# Patient Record
Sex: FEMALE | Race: WHITE | NOT HISPANIC OR LATINO
[De-identification: names, ages, dates, MRNs, and addresses within clinical notes are randomized per-mention and may not be internally consistent; named-entity substitution may affect disease eponyms.]

---

## 2017-02-12 PROBLEM — Z00.00 ENCOUNTER FOR PREVENTIVE HEALTH EXAMINATION: Status: ACTIVE | Noted: 2017-02-12

## 2017-03-13 ENCOUNTER — APPOINTMENT (OUTPATIENT)
Dept: OTOLARYNGOLOGY | Facility: CLINIC | Age: 82
End: 2017-03-13

## 2017-10-26 ENCOUNTER — OUTPATIENT (OUTPATIENT)
Dept: OUTPATIENT SERVICES | Facility: HOSPITAL | Age: 82
LOS: 1 days | Discharge: HOME | End: 2017-10-26

## 2017-10-30 DIAGNOSIS — Z13.820 ENCOUNTER FOR SCREENING FOR OSTEOPOROSIS: ICD-10-CM

## 2017-10-30 DIAGNOSIS — M89.9 DISORDER OF BONE, UNSPECIFIED: ICD-10-CM

## 2017-10-30 DIAGNOSIS — Z78.0 ASYMPTOMATIC MENOPAUSAL STATE: ICD-10-CM

## 2017-11-27 ENCOUNTER — OUTPATIENT (OUTPATIENT)
Dept: OUTPATIENT SERVICES | Facility: HOSPITAL | Age: 82
LOS: 1 days | Discharge: HOME | End: 2017-11-27

## 2017-11-27 DIAGNOSIS — M25.561 PAIN IN RIGHT KNEE: ICD-10-CM

## 2018-01-04 ENCOUNTER — APPOINTMENT (OUTPATIENT)
Dept: OTOLARYNGOLOGY | Facility: CLINIC | Age: 83
End: 2018-01-04

## 2018-04-12 ENCOUNTER — OUTPATIENT (OUTPATIENT)
Dept: OUTPATIENT SERVICES | Facility: HOSPITAL | Age: 83
LOS: 1 days | Discharge: HOME | End: 2018-04-12

## 2018-04-12 DIAGNOSIS — M48.02 SPINAL STENOSIS, CERVICAL REGION: ICD-10-CM

## 2018-07-06 ENCOUNTER — OUTPATIENT (OUTPATIENT)
Dept: OUTPATIENT SERVICES | Facility: HOSPITAL | Age: 83
LOS: 1 days | Discharge: HOME | End: 2018-07-06

## 2018-07-06 DIAGNOSIS — M54.2 CERVICALGIA: ICD-10-CM

## 2018-07-25 ENCOUNTER — APPOINTMENT (OUTPATIENT)
Dept: OTOLARYNGOLOGY | Facility: CLINIC | Age: 83
End: 2018-07-25

## 2018-09-10 ENCOUNTER — APPOINTMENT (OUTPATIENT)
Dept: OTOLARYNGOLOGY | Facility: CLINIC | Age: 83
End: 2018-09-10

## 2020-10-07 ENCOUNTER — APPOINTMENT (OUTPATIENT)
Dept: VASCULAR SURGERY | Facility: CLINIC | Age: 85
End: 2020-10-07
Payer: MEDICARE

## 2020-10-07 VITALS — DIASTOLIC BLOOD PRESSURE: 75 MMHG | SYSTOLIC BLOOD PRESSURE: 132 MMHG

## 2020-10-07 VITALS — HEIGHT: 64 IN | BODY MASS INDEX: 18.95 KG/M2 | WEIGHT: 111 LBS | TEMPERATURE: 97.4 F

## 2020-10-07 PROCEDURE — 99203 OFFICE O/P NEW LOW 30 MIN: CPT

## 2020-10-07 PROCEDURE — 93880 EXTRACRANIAL BILAT STUDY: CPT

## 2021-03-11 ENCOUNTER — EMERGENCY (EMERGENCY)
Facility: HOSPITAL | Age: 86
LOS: 0 days | Discharge: HOME | End: 2021-03-11
Attending: EMERGENCY MEDICINE | Admitting: EMERGENCY MEDICINE
Payer: MEDICARE

## 2021-03-11 VITALS
OXYGEN SATURATION: 97 % | DIASTOLIC BLOOD PRESSURE: 67 MMHG | RESPIRATION RATE: 19 BRPM | WEIGHT: 110.01 LBS | TEMPERATURE: 98 F | SYSTOLIC BLOOD PRESSURE: 133 MMHG | HEART RATE: 85 BPM
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
## 2021-03-11 DIAGNOSIS — M79.641 PAIN IN RIGHT HAND: ICD-10-CM
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
## 2021-03-11 DIAGNOSIS — Z88.5 ALLERGY STATUS TO NARCOTIC AGENT: ICD-10-CM

## 2021-03-11 DIAGNOSIS — M25.531 PAIN IN RIGHT WRIST: ICD-10-CM

## 2021-03-11 DIAGNOSIS — I10 ESSENTIAL (PRIMARY) HYPERTENSION: ICD-10-CM
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
## 2021-03-11 PROCEDURE — 73110 X-RAY EXAM OF WRIST: CPT | Mod: 26,RT

## 2021-03-11 PROCEDURE — 99283 EMERGENCY DEPT VISIT LOW MDM: CPT

## 2021-03-11 PROCEDURE — 73130 X-RAY EXAM OF HAND: CPT | Mod: 26,RT

## 2021-03-11 RX ORDER — ACETAMINOPHEN 500 MG
650 TABLET ORAL ONCE
Refills: 0 | Status: COMPLETED | OUTPATIENT
Start: 2021-03-11 | End: 2021-03-11

## 2021-03-11 RX ADMIN — Medication 650 MILLIGRAM(S): at 11:42

## 2021-03-11 NOTE — ED PROVIDER NOTE - NS ED ROS FT
Review of Systems:  CONSTITUTIONAL: No fever/chills  SKIN: No rash  EYES: No eye pain, No blurred vision  ENT: No sore throat, No neck pain, No rhinorrhea, No ear pain  RESPIRATORY: No shortness of breath, No cough  CARDIAC: No chest pain, No palpitations  GI: No abdominal pain, No nausea, No vomiting, No diarrhea, No constipation  MUSCULOSKELETAL: +R hand/wrist pain see HPI. No swelling, No back pain  NEUROLOGIC: No numbness, No focal weakness, No headache, No dizziness  All other systems negative, unless specified in HPI

## 2021-03-11 NOTE — ED PROVIDER NOTE - PATIENT PORTAL LINK FT
You can access the FollowMyHealth Patient Portal offered by [facility] by registering at the following website: [email or web address]. By joining [employer]’s FollowMyHealth portal, you will also be able to view your health information using other applications (apps) compatible with our system.

## 2021-03-11 NOTE — ED PROVIDER NOTE - OBJECTIVE STATEMENT
86F PMHx osteoarthritis and HTN presents to ED for R hand and wrist pain x 1 week. Pt reports she threw her purse in her car 1 week ago and felt like she strained her R hand, has been having intermittent mild to moderate R hand and wrist pain since event. Took Tylenol once yesterday with some relief. Denies numbness, tingling, weakness, dizziness, chest pain, sob, abd pain, nausea, vomiting, fever/chills, all other complaints.

## 2021-03-11 NOTE — ED PROVIDER NOTE - PHYSICAL EXAMINATION
VITAL SIGNS: I have reviewed nursing notes and confirm.  CONSTITUTIONAL: well-appearing thin elderly woman sitting up in chair in NAD  SKIN: Warm dry, normal skin turgor  HEAD: NCAT  EYES: EOMI, no scleral icterus  NECK: Supple; non tender.   CARD: RRR, no murmurs, rubs or gallops  RESP: clear to ausculation b/l.  No rales, rhonchi, or wheezing.  ABD: soft, non-tender, non-distended, no rebound or guarding.   EXT: ttp to dorsum of R lateral wrist, +snuffbox tenderness, limited flexion and extension of wrist due to pain, peripheral pulses 2+ bilateral, normal sensation  NEURO: normal motor. normal sensory. Normal gait.  PSYCH: Cooperative, appropriate.

## 2021-03-11 NOTE — ED PROVIDER NOTE - CARE PROVIDER_API CALL
Jaya Holliday)  Orthopaedic Surgery  [address] [place] [address]  Phone: [phone]  Fax: [phone]  Follow Up Time: 1-3 Days

## 2021-03-11 NOTE — ED PROVIDER NOTE - CLINICAL SUMMARY MEDICAL DECISION MAKING FREE TEXT BOX
patient presents with right sided wrist pain after sustaining an extension injury while putting her purse in a car approx 1 week prior she has slight ttp of the right distal radius cap refill normal, radial pulses 2 +=,  no signs of infection at this time although xray appears negative patient has extensive arthritis based on clinical exam placed patient in a splint with follow up to ortho dr castano for potential advanced imaging at this time we discussed indications to return

## 2021-03-11 NOTE — ED PROVIDER NOTE - ATTENDING CONTRIBUTION TO CARE
I was present for and supervised the key and critical aspects of the procedures performed during the care of the patient. patient presents with right sided wrist pain after sustaining an extension injury while putting her purse in a car approx 1 week prior she has slight ttp of the right distal radius cap refill normal, radial pulses 2 +=,  no signs of infection at this time although xray appears negative patient has extensive arthritis based on clinical exam placed patient in a splint with follow up to ortho dr castano for potential advanced imaging at this time we discussed indications to return

## 2021-03-11 NOTE — ED PROVIDER NOTE - NSFOLLOWUPINSTRUCTIONS_ED_ALL_ED_FT
Please follow up with Dr. Holliday in 24-48 hours. Please keep your splint dry and clean until you follow up. You may take Tylenol as needed for pain relief.    Hand Pain  Many things can cause hand pain. Some common causes are:  An injury.  Repeating the same movement with your hand over and over (overuse).  Osteoporosis.  Arthritis.  Lumps in the tendons or joints of the hand and wrist (ganglion cysts).  Infection.  Follow these instructions at home:  Pay attention to any changes in your symptoms. Take these actions to help with your discomfort:  If directed, put ice on the affected area:  Put ice in a plastic bag.  Place a towel between your skin and the bag.  Leave the ice on for 15–20 minutes, 3?4 times a day for 2 days.  Take over-the-counter and prescription medicines only as told by your health care provider.  Minimize stress on your hands and wrists as much as possible.  Take breaks from repetitive activity often.  Do stretches as told by your health care provider.  Do not do activities that make your pain worse.  Contact a health care provider if:  Your pain does not get better after a few days of self-care.  Your pain gets worse.  Your pain affects your ability to do your daily activities.  Get help right away if:  Your hand becomes warm, red, or swollen.  Your hand is numb or tingling.  Your hand is extremely swollen or deformed.  Your hand or fingers turn white or blue.  You cannot move your hand, wrist, or fingers.  This information is not intended to replace advice given to you by your health care provider. Make sure you discuss any questions you have with your health care provider.    ----------------------------    Cast or Splint Care  Casts and splints are supports that are worn to protect broken bones and other injuries. A cast or splint may hold a bone still and in the correct position while it heals. Casts and splints may also help ease pain, swelling, and muscle spasms.    A cast is a hardened support that is usually made of fiberglass or plaster. It is custom-fit to the body and it offers more protection than a splint. It cannot be taken off and put back on. A splint is a type of soft support that is usually made from cloth and elastic. It can be adjusted or taken off as needed.    Your child may need a cast or a splint if he or she:  Has a broken bone.  Has a soft-tissue injury.  Needs to keep an injured body part from moving (keep it immobile) after surgery.  How to care for your child's cast  Do not allow your child to stick anything inside the cast to scratch the skin. Sticking something in the cast increases your child's risk of infection.  Check the skin around the cast every day. Tell your child's health care provider about any concerns.  You may put lotion on dry skin around the edges of the cast. Do not put lotion on the skin underneath the cast.  Keep the cast clean.  If the cast is not waterproof:  Do not let it get wet.  Cover it with a watertight covering when your child takes a bath or a shower.  How to care for your child's splint  Have your child wear it as told by your child's health care provider. Remove it only as told by your child's health care provider.  Loosen the splint if your child's fingers or toes tingle, become numb, or turn cold and blue.  Keep the splint clean.  If the splint is not waterproof:  Do not let it get wet.  Cover it with a watertight covering when your child takes a bath or a shower.  Follow these instructions at home:  Bathing     Do not have your child take baths or swim until his or her health care provider approves. Ask your child's health care provider if your child can take showers. Your child may only be allowed to take sponge baths for bathing.  If your child's cast or splint is not waterproof, cover it with a watertight covering when he or she takes a bath or shower.  Managing pain, stiffness, and swelling       Have your child move his or her fingers or toes often to avoid stiffness and to lessen swelling.  Have your child raise (elevate) the injured area above the level of his or her heart while he or she is sitting or lying down.  Safety     Do not allow your child to use the injured limb to support his or her body weight until your child's health care provider says that it is okay.  Have your child use crutches or other assistive devices as told by your child's health care provider.  General instructions     Do not allow your child to put pressure on any part of the cast or splint until it is fully hardened. This may take several hours.  Have your child return to his or her normal activities as told by his or her health care provider. Ask your child's health care provider what activities are safe for your child.  Give over-the-counter and prescription medicines only as told by your child's health care provider.  Keep all follow-up visits as told by your child’s health care provider. This is important.  Contact a health care provider if:  Your child’s cast or splint gets damaged.  Your child's skin under or around the cast becomes red or raw.  Your child’s skin under the cast is extremely itchy or painful.  Your child's cast or splint feels very uncomfortable.  Your child’s cast or splint is too tight or too loose.  Your child’s cast becomes wet or it develops a soft spot or area.  Your child gets an object stuck under the cast.  Get help right away if:  Your child's pain is getting worse.  Your child’s injured area tingles, becomes numb, or turns cold and blue.  The part of your child's body above or below the cast is swollen or discolored.  Your child cannot feel or move his or her fingers or toes.  There is fluid leaking through the cast.  Your child has severe pain or pressure under the cast.  This information is not intended to replace advice given to you by your health care provider. Make sure you discuss any questions you have with your health care provider

## 2021-03-12 NOTE — ED POST DISCHARGE NOTE - RESULT SUMMARY
R HAND- AVASCULAR NECROSIS SCHAPHOID/LUNUTE IS A POSSIBILITY. PATIENT SAW ORTHO TODAY AND F/U IN ABOUT 3 WEEKS. AND WILL DISCUSS THIS ISSUE THEN.

## 2021-09-07 ENCOUNTER — APPOINTMENT (OUTPATIENT)
Dept: PULMONOLOGY | Facility: CLINIC | Age: 86
End: 2021-09-07
Payer: MEDICARE

## 2021-09-07 VITALS
RESPIRATION RATE: 14 BRPM | DIASTOLIC BLOOD PRESSURE: 80 MMHG | SYSTOLIC BLOOD PRESSURE: 130 MMHG | HEART RATE: 88 BPM | HEIGHT: 64 IN | BODY MASS INDEX: 18.95 KG/M2 | OXYGEN SATURATION: 96 % | WEIGHT: 111 LBS

## 2021-09-07 PROCEDURE — 71046 X-RAY EXAM CHEST 2 VIEWS: CPT

## 2021-09-07 PROCEDURE — 99213 OFFICE O/P EST LOW 20 MIN: CPT | Mod: 25

## 2021-10-06 ENCOUNTER — APPOINTMENT (OUTPATIENT)
Dept: VASCULAR SURGERY | Facility: CLINIC | Age: 86
End: 2021-10-06

## 2021-11-05 ENCOUNTER — OUTPATIENT (OUTPATIENT)
Dept: OUTPATIENT SERVICES | Facility: HOSPITAL | Age: 86
LOS: 1 days | Discharge: HOME | End: 2021-11-05

## 2021-11-05 DIAGNOSIS — Z11.59 ENCOUNTER FOR SCREENING FOR OTHER VIRAL DISEASES: ICD-10-CM

## 2021-11-08 ENCOUNTER — OUTPATIENT (OUTPATIENT)
Dept: OUTPATIENT SERVICES | Facility: HOSPITAL | Age: 86
LOS: 1 days | Discharge: HOME | End: 2021-11-08
Payer: MEDICARE

## 2021-11-08 ENCOUNTER — RESULT REVIEW (OUTPATIENT)
Age: 86
End: 2021-11-08

## 2021-11-08 ENCOUNTER — TRANSCRIPTION ENCOUNTER (OUTPATIENT)
Age: 86
End: 2021-11-08

## 2021-11-08 VITALS
HEART RATE: 72 BPM | SYSTOLIC BLOOD PRESSURE: 115 MMHG | RESPIRATION RATE: 16 BRPM | DIASTOLIC BLOOD PRESSURE: 58 MMHG | OXYGEN SATURATION: 96 % | TEMPERATURE: 98 F

## 2021-11-08 VITALS — HEIGHT: 66 IN | WEIGHT: 110.01 LBS

## 2021-11-08 DIAGNOSIS — Z98.1 ARTHRODESIS STATUS: Chronic | ICD-10-CM

## 2021-11-08 PROCEDURE — 88312 SPECIAL STAINS GROUP 1: CPT | Mod: 26

## 2021-11-08 PROCEDURE — 88305 TISSUE EXAM BY PATHOLOGIST: CPT | Mod: 26

## 2021-11-08 NOTE — CHART NOTE - NSCHARTNOTEFT_GEN_A_CORE
PACU ANESTHESIA ADMISSION NOTE      Procedure:   Post op diagnosis:      ____  Intubated  TV:______       Rate: ______      FiO2: ______    _x___  Patent Airway    __x__  Full return of protective reflexes    _x___  Full recovery from anesthesia / back to baseline     Vitals:   T: 98          R:  12                BP: 121/78                 Sat: 100                  P: 80      Mental Status:  ___x_ Awake   __x___ Alert   _____ Drowsy   _____ Sedated    Nausea/Vomiting:  __x__ NO  ______Yes,   See Post - Op Orders          Pain Scale (0-10):  _____    Treatment: __x__ None    ____ See Post - Op/PCA Orders    Post - Operative Fluids:   ____ Oral   _x___ See Post - Op Orders    Plan: Discharge:   _x___Home       _____Floor     _____Critical Care    _____  Other:_________________    Comments:

## 2021-11-11 LAB — SURGICAL PATHOLOGY STUDY: SIGNIFICANT CHANGE UP

## 2021-11-17 DIAGNOSIS — J44.9 CHRONIC OBSTRUCTIVE PULMONARY DISEASE, UNSPECIFIED: ICD-10-CM

## 2021-11-17 DIAGNOSIS — I10 ESSENTIAL (PRIMARY) HYPERTENSION: ICD-10-CM

## 2021-11-17 DIAGNOSIS — E78.00 PURE HYPERCHOLESTEROLEMIA, UNSPECIFIED: ICD-10-CM

## 2021-11-17 DIAGNOSIS — Z88.5 ALLERGY STATUS TO NARCOTIC AGENT: ICD-10-CM

## 2021-11-17 DIAGNOSIS — K29.50 UNSPECIFIED CHRONIC GASTRITIS WITHOUT BLEEDING: ICD-10-CM

## 2021-11-17 DIAGNOSIS — Z87.891 PERSONAL HISTORY OF NICOTINE DEPENDENCE: ICD-10-CM

## 2021-11-17 DIAGNOSIS — K20.90 ESOPHAGITIS, UNSPECIFIED WITHOUT BLEEDING: ICD-10-CM

## 2021-11-17 DIAGNOSIS — D64.9 ANEMIA, UNSPECIFIED: ICD-10-CM

## 2021-11-17 DIAGNOSIS — K57.30 DIVERTICULOSIS OF LARGE INTESTINE WITHOUT PERFORATION OR ABSCESS WITHOUT BLEEDING: ICD-10-CM

## 2021-11-17 DIAGNOSIS — K64.8 OTHER HEMORRHOIDS: ICD-10-CM

## 2021-11-17 DIAGNOSIS — K64.4 RESIDUAL HEMORRHOIDAL SKIN TAGS: ICD-10-CM

## 2021-11-29 ENCOUNTER — EMERGENCY (EMERGENCY)
Facility: HOSPITAL | Age: 86
LOS: 0 days | Discharge: HOME | End: 2021-11-29
Admitting: EMERGENCY MEDICINE
Payer: MEDICARE

## 2021-11-29 ENCOUNTER — INPATIENT (INPATIENT)
Facility: HOSPITAL | Age: 86
LOS: 3 days | Discharge: HOME HEALTH | End: 2021-12-03
Attending: FAMILY MEDICINE | Admitting: FAMILY MEDICINE
Payer: MEDICARE

## 2021-11-29 VITALS
DIASTOLIC BLOOD PRESSURE: 90 MMHG | OXYGEN SATURATION: 85 % | HEIGHT: 64 IN | SYSTOLIC BLOOD PRESSURE: 185 MMHG | RESPIRATION RATE: 28 BRPM | HEART RATE: 100 BPM | WEIGHT: 115.08 LBS | TEMPERATURE: 100 F

## 2021-11-29 DIAGNOSIS — Z20.822 CONTACT WITH AND (SUSPECTED) EXPOSURE TO COVID-19: ICD-10-CM

## 2021-11-29 DIAGNOSIS — Z87.891 PERSONAL HISTORY OF NICOTINE DEPENDENCE: ICD-10-CM

## 2021-11-29 DIAGNOSIS — J44.1 CHRONIC OBSTRUCTIVE PULMONARY DISEASE WITH (ACUTE) EXACERBATION: ICD-10-CM

## 2021-11-29 DIAGNOSIS — R06.02 SHORTNESS OF BREATH: ICD-10-CM

## 2021-11-29 DIAGNOSIS — Z02.9 ENCOUNTER FOR ADMINISTRATIVE EXAMINATIONS, UNSPECIFIED: ICD-10-CM

## 2021-11-29 DIAGNOSIS — Z98.1 ARTHRODESIS STATUS: Chronic | ICD-10-CM

## 2021-11-29 DIAGNOSIS — Z88.5 ALLERGY STATUS TO NARCOTIC AGENT: ICD-10-CM

## 2021-11-29 LAB
ALBUMIN SERPL ELPH-MCNC: 4.4 G/DL — SIGNIFICANT CHANGE UP (ref 3.5–5.2)
ALP SERPL-CCNC: 97 U/L — SIGNIFICANT CHANGE UP (ref 30–115)
ALT FLD-CCNC: 11 U/L — SIGNIFICANT CHANGE UP (ref 0–41)
ANION GAP SERPL CALC-SCNC: 14 MMOL/L — SIGNIFICANT CHANGE UP (ref 7–14)
APTT BLD: 31.4 SEC — SIGNIFICANT CHANGE UP (ref 27–39.2)
AST SERPL-CCNC: 20 U/L — SIGNIFICANT CHANGE UP (ref 0–41)
BASOPHILS # BLD AUTO: 0.04 K/UL — SIGNIFICANT CHANGE UP (ref 0–0.2)
BASOPHILS NFR BLD AUTO: 0.3 % — SIGNIFICANT CHANGE UP (ref 0–1)
BILIRUB SERPL-MCNC: 0.3 MG/DL — SIGNIFICANT CHANGE UP (ref 0.2–1.2)
BUN SERPL-MCNC: 10 MG/DL — SIGNIFICANT CHANGE UP (ref 10–20)
CALCIUM SERPL-MCNC: 9.2 MG/DL — SIGNIFICANT CHANGE UP (ref 8.5–10.1)
CHLORIDE SERPL-SCNC: 96 MMOL/L — LOW (ref 98–110)
CO2 SERPL-SCNC: 22 MMOL/L — SIGNIFICANT CHANGE UP (ref 17–32)
CREAT SERPL-MCNC: 0.7 MG/DL — SIGNIFICANT CHANGE UP (ref 0.7–1.5)
EOSINOPHIL # BLD AUTO: 0 K/UL — SIGNIFICANT CHANGE UP (ref 0–0.7)
EOSINOPHIL NFR BLD AUTO: 0 % — SIGNIFICANT CHANGE UP (ref 0–8)
GLUCOSE SERPL-MCNC: 122 MG/DL — HIGH (ref 70–99)
HCT VFR BLD CALC: 35.1 % — LOW (ref 37–47)
HGB BLD-MCNC: 11.1 G/DL — LOW (ref 12–16)
IMM GRANULOCYTES NFR BLD AUTO: 0.3 % — SIGNIFICANT CHANGE UP (ref 0.1–0.3)
INR BLD: 0.96 RATIO — SIGNIFICANT CHANGE UP (ref 0.65–1.3)
LYMPHOCYTES # BLD AUTO: 0.75 K/UL — LOW (ref 1.2–3.4)
LYMPHOCYTES # BLD AUTO: 5.8 % — LOW (ref 20.5–51.1)
MCHC RBC-ENTMCNC: 26.1 PG — LOW (ref 27–31)
MCHC RBC-ENTMCNC: 31.6 G/DL — LOW (ref 32–37)
MCV RBC AUTO: 82.6 FL — SIGNIFICANT CHANGE UP (ref 81–99)
MONOCYTES # BLD AUTO: 1.17 K/UL — HIGH (ref 0.1–0.6)
MONOCYTES NFR BLD AUTO: 9 % — SIGNIFICANT CHANGE UP (ref 1.7–9.3)
NEUTROPHILS # BLD AUTO: 11.01 K/UL — HIGH (ref 1.4–6.5)
NEUTROPHILS NFR BLD AUTO: 84.6 % — HIGH (ref 42.2–75.2)
NRBC # BLD: 0 /100 WBCS — SIGNIFICANT CHANGE UP (ref 0–0)
PLATELET # BLD AUTO: 322 K/UL — SIGNIFICANT CHANGE UP (ref 130–400)
POTASSIUM SERPL-MCNC: 4.6 MMOL/L — SIGNIFICANT CHANGE UP (ref 3.5–5)
POTASSIUM SERPL-SCNC: 4.6 MMOL/L — SIGNIFICANT CHANGE UP (ref 3.5–5)
PROT SERPL-MCNC: 6.6 G/DL — SIGNIFICANT CHANGE UP (ref 6–8)
PROTHROM AB SERPL-ACNC: 11.1 SEC — SIGNIFICANT CHANGE UP (ref 9.95–12.87)
RAPID RVP RESULT: DETECTED
RBC # BLD: 4.25 M/UL — SIGNIFICANT CHANGE UP (ref 4.2–5.4)
RBC # FLD: 17.9 % — HIGH (ref 11.5–14.5)
RSV RNA SPEC QL NAA+PROBE: DETECTED
SARS-COV-2 RNA SPEC QL NAA+PROBE: SIGNIFICANT CHANGE UP
SODIUM SERPL-SCNC: 132 MMOL/L — LOW (ref 135–146)
TROPONIN T SERPL-MCNC: <0.01 NG/ML — SIGNIFICANT CHANGE UP
WBC # BLD: 13.01 K/UL — HIGH (ref 4.8–10.8)
WBC # FLD AUTO: 13.01 K/UL — HIGH (ref 4.8–10.8)

## 2021-11-29 PROCEDURE — 99221 1ST HOSP IP/OBS SF/LOW 40: CPT

## 2021-11-29 PROCEDURE — 99285 EMERGENCY DEPT VISIT HI MDM: CPT

## 2021-11-29 PROCEDURE — L9981: CPT

## 2021-11-29 PROCEDURE — 93010 ELECTROCARDIOGRAM REPORT: CPT

## 2021-11-29 PROCEDURE — 71045 X-RAY EXAM CHEST 1 VIEW: CPT | Mod: 26

## 2021-11-29 RX ORDER — SIMVASTATIN 20 MG/1
20 TABLET, FILM COATED ORAL AT BEDTIME
Refills: 0 | Status: DISCONTINUED | OUTPATIENT
Start: 2021-11-29 | End: 2021-12-03

## 2021-11-29 RX ORDER — IPRATROPIUM/ALBUTEROL SULFATE 18-103MCG
3 AEROSOL WITH ADAPTER (GRAM) INHALATION EVERY 6 HOURS
Refills: 0 | Status: DISCONTINUED | OUTPATIENT
Start: 2021-11-29 | End: 2021-11-29

## 2021-11-29 RX ORDER — ENOXAPARIN SODIUM 100 MG/ML
40 INJECTION SUBCUTANEOUS AT BEDTIME
Refills: 0 | Status: DISCONTINUED | OUTPATIENT
Start: 2021-11-29 | End: 2021-12-03

## 2021-11-29 RX ORDER — METOPROLOL TARTRATE 50 MG
1 TABLET ORAL
Qty: 0 | Refills: 0 | DISCHARGE

## 2021-11-29 RX ORDER — LANOLIN ALCOHOL/MO/W.PET/CERES
3 CREAM (GRAM) TOPICAL AT BEDTIME
Refills: 0 | Status: DISCONTINUED | OUTPATIENT
Start: 2021-11-29 | End: 2021-12-03

## 2021-11-29 RX ORDER — ACETAMINOPHEN 500 MG
650 TABLET ORAL EVERY 6 HOURS
Refills: 0 | Status: DISCONTINUED | OUTPATIENT
Start: 2021-11-29 | End: 2021-12-03

## 2021-11-29 RX ORDER — PANTOPRAZOLE SODIUM 20 MG/1
40 TABLET, DELAYED RELEASE ORAL
Refills: 0 | Status: DISCONTINUED | OUTPATIENT
Start: 2021-11-29 | End: 2021-12-03

## 2021-11-29 RX ORDER — IPRATROPIUM/ALBUTEROL SULFATE 18-103MCG
3 AEROSOL WITH ADAPTER (GRAM) INHALATION EVERY 6 HOURS
Refills: 0 | Status: DISCONTINUED | OUTPATIENT
Start: 2021-11-29 | End: 2021-12-02

## 2021-11-29 RX ORDER — ONDANSETRON 8 MG/1
4 TABLET, FILM COATED ORAL EVERY 8 HOURS
Refills: 0 | Status: DISCONTINUED | OUTPATIENT
Start: 2021-11-29 | End: 2021-12-03

## 2021-11-29 RX ORDER — GUAIFENESIN/DEXTROMETHORPHAN 600MG-30MG
10 TABLET, EXTENDED RELEASE 12 HR ORAL EVERY 6 HOURS
Refills: 0 | Status: DISCONTINUED | OUTPATIENT
Start: 2021-11-29 | End: 2021-12-03

## 2021-11-29 RX ORDER — METOPROLOL TARTRATE 50 MG
25 TABLET ORAL
Refills: 0 | Status: DISCONTINUED | OUTPATIENT
Start: 2021-11-29 | End: 2021-11-29

## 2021-11-29 RX ORDER — AMLODIPINE BESYLATE 2.5 MG/1
5 TABLET ORAL DAILY
Refills: 0 | Status: DISCONTINUED | OUTPATIENT
Start: 2021-11-29 | End: 2021-12-03

## 2021-11-29 RX ORDER — CHLORHEXIDINE GLUCONATE 213 G/1000ML
1 SOLUTION TOPICAL
Refills: 0 | Status: DISCONTINUED | OUTPATIENT
Start: 2021-11-29 | End: 2021-12-03

## 2021-11-29 RX ORDER — BUDESONIDE AND FORMOTEROL FUMARATE DIHYDRATE 160; 4.5 UG/1; UG/1
2 AEROSOL RESPIRATORY (INHALATION)
Refills: 0 | Status: DISCONTINUED | OUTPATIENT
Start: 2021-11-29 | End: 2021-12-03

## 2021-11-29 RX ADMIN — Medication 3 MILLILITER(S): at 17:05

## 2021-11-29 RX ADMIN — Medication 52 MILLIGRAM(S): at 15:45

## 2021-11-29 RX ADMIN — SIMVASTATIN 20 MILLIGRAM(S): 20 TABLET, FILM COATED ORAL at 22:42

## 2021-11-29 NOTE — H&P ADULT - ASSESSMENT
86F w/PMHx of COPD not on home o2, HTN presents to ED with complaints of SOB.  Patient reports symptoms began approximately 3-4 days ago with URI type symptoms, found to have RSV infection with COPD exacerbation    #Acute Hypoxic Respiratory Failure 2/2 COPD Exacerbation 2/2 RSV infection  - admit to medicine service  - Solumedrol 60mg IVP q12h, monitor on ISS to cover for steroid induced hyperglycemia  - b2 PRN  - start LABA/ICS  - Robitussin DM 10ml q6h PRN  - DVT/GI PPX (Lovenox/Protonix)  - CHG 4% QD and PRN     #HTN  - c/w Amlodipine and Metoprolol at home doses  - DASH diet    #HLD  - c/w simvastatin  - DASH diet   86F w/PMHx of COPD not on home o2, HTN presents to ED with complaints of SOB.  Patient reports symptoms began approximately 3-4 days ago with URI type symptoms, found to have RSV infection with COPD exacerbation    #Acute Hypoxic Respiratory Failure 2/2 COPD Exacerbation 2/2 RSV infection  - admit to medicine service  - Solumedrol 60mg IVP q12h, monitor on ISS to cover for steroid induced hyperglycemia  - b2 PRN  - start LABA/ICS  - Robitussin DM 10ml q6h PRN  - DVT/GI PPX (Lovenox/Protonix)  - CHG 4% QD and PRN     #HTN  - c/w Amlodipine at home doses  - will hold metoprolol given beta blockers can contribute to sob  - DASH diet    #HLD  - c/w simvastatin  - DASH diet

## 2021-11-29 NOTE — H&P ADULT - HISTORY OF PRESENT ILLNESS
86F w/PMHx of COPD not on home o2, HTN presents to ED with complaints of SOB.  Patient reports symptoms began approximately 3-4 days ago with URI type symptoms.  She reports she woke up this AM with acute SOB and came to the ED for evaluation.  Patient reports +SOB, ANGEL.  + cough, worsened from baseline with scant clear production.  Reports + NC, patient had a sore throat but it resolved.  No fever/chills.  No CP.  No abdominal or urinary complaints  Patient with hx COPD, not on maintenance inhalers.  Follows with Dr. Wright.

## 2021-11-29 NOTE — ED PROVIDER NOTE - PROGRESS NOTE DETAILS
Jaxon:  guyx.  PO 99% spoke to hospitalist upon dc patient was 90 on ra but with ambulation patient o2 is 86 .   will admit patient to hospital . patient agrees

## 2021-11-29 NOTE — ED ADULT NURSE REASSESSMENT NOTE - NS ED NURSE REASSESS COMMENT FT1
1730 -  Pt cleared for d/c. Discharge complete, RN assisted pt oob to standing position and NC removed. However, pt became hypoxic to 85% on room air and tachycardic to 120s. Ambulating 10ft, pt visibly SOB, tachy to 130s and desat to low 80s on room air. PA Benjamin informed. Pt endorsed feeling SOB on minimal exertion. Discussed admission vs discharge options with PA and pt and pt spouse. Pt verbalizes wishing to be admitted. Pt no longer d/c, now pending med surg admit. Pt placed on continuous pulse ox monitoring and 2L NC.     2010 - New IV placed. Pt appearing comfortable on 2L NC. Pt handed off to inpt team, DIMITRIOS Gunter.

## 2021-11-29 NOTE — H&P ADULT - NSHPLABSRESULTS_GEN_ALL_CORE
11.1   13.01 )-----------( 322      ( 29 Nov 2021 15:59 )             35.1       11-29    132<L>  |  96<L>  |  10  ----------------------------<  122<H>  4.6   |  22  |  0.7    Ca    9.2      29 Nov 2021 15:59    TPro  6.6  /  Alb  4.4  /  TBili  0.3  /  DBili  x   /  AST  20  /  ALT  11  /  AlkPhos  97  11-29          PT/INR - ( 29 Nov 2021 15:59 )   PT: 11.10 sec;   INR: 0.96 ratio         PTT - ( 29 Nov 2021 15:59 )  PTT:31.4 sec    Lactate Trend      CARDIAC MARKERS ( 29 Nov 2021 15:59 )  x     / <0.01 ng/mL / x     / x     / x        CXR    IMPRESSION:    No radiographic evidence of acute cardiopulmonary disease.

## 2021-11-29 NOTE — ED PROVIDER NOTE - ATTENDING CONTRIBUTION TO CARE
c/o SOB.  hx COPD.   VS noted.  NAD.  bilateral wheezing and diminished BS.  nebs, steroids ordered.

## 2021-11-29 NOTE — ED ADULT TRIAGE NOTE - WILL THE PATIENT ACCEPT THE PFIZER COVID-19 VACCINE IF ELIGIBLE AND IT IS AVAILABLE?
Not applicable [Fatigue] : fatigue [Negative] : Allergic/Immunologic [Fever] : no fever [Chills] : no chills [Night Sweats] : no night sweats [Recent Change In Weight] : ~T no recent weight change [Dysphagia] : no dysphagia [Loss of Hearing] : no loss of hearing [Nosebleeds] : no nosebleeds [Hoarseness] : no hoarseness [Odynophagia] : no odynophagia [Mucosal Pain] : no mucosal pain [Joint Pain] : no joint pain [Joint Stiffness] : no joint stiffness [Muscle Pain] : no muscle pain [Muscle Weakness] : no muscle weakness [Skin Rash] : no skin rash [Skin Wound] : no skin wound [FreeTextEntry4] : sneezing and mouth sores  [FreeTextEntry9] : tingling sensation over the LLQ  [de-identified] : mild tenderness over feet

## 2021-11-29 NOTE — ED PROVIDER NOTE - CLINICAL SUMMARY MEDICAL DECISION MAKING FREE TEXT BOX
sx refractory tot ED tx.  In my opinion, in patient treatment is medically justifiable and appropriate.

## 2021-11-29 NOTE — H&P ADULT - NSHPPHYSICALEXAM_GEN_ALL_CORE
Problem: Ventilation Defect:  Goal: Ability to achieve and maintain unassisted ventilation or tolerate decreased levels of ventilator support    Intervention: Support and monitor invasive and noninvasive mechanical ventilation  Adult Ventilation Update    Total Vent Days: 3    Patient Lines/Drains/Airways Status    Active Airway     Name: Placement date: Placement time: Site: Days:    Airway Group ET Tube Oral 8.0 06/14/18   1830   Oral   1                       Vital Signs (24 Hrs):  T(C): 37.1 (11-29-21 @ 17:09), Max: 37.7 (11-29-21 @ 14:30)  HR: 117 (11-29-21 @ 17:30) (92 - 117)  BP: 150/69 (11-29-21 @ 17:09) (150/69 - 185/90)  RR: 20 (11-29-21 @ 17:30) (20 - 28)  SpO2: 87% (11-29-21 @ 17:30) (85% - 95%)    PHYSICAL EXAM:  GENERAL: NAD, well-developed  SKIN: No rashes or lesions  HEAD:  Atraumatic, Normocephalic  EYES: EOMI, PERRLA, conjunctiva and sclera clear  NECK: Supple, No JVD  CHEST/LUNG: decreased airflow with casual wheeze  HEART: Regular rate and rhythm; No murmurs, rubs, or gallops  ABDOMEN: Soft, Nontender, Nondistended; Bowel sounds present  EXTREMITIES:  No clubbing, cyanosis, or edema  CNS: AAOx3

## 2021-11-29 NOTE — ED PROVIDER NOTE - NS ED ROS FT
Review of Systems:  	•	CONSTITUTIONAL - no fever, no diaphoresis, no chills  	•	SKIN - no rash  	•	HEMATOLOGIC - no bleeding, no bruising  	•	EYES - no eye pain, no blurry vision  	•	ENT - no change in hearing, no sore throat, no ear pain or tinnitus  	•	RESPIRATORY - shortness of breath, no cough  	•	CARDIAC - no chest pain, no palpitations  	•	GI - no abd pain, no nausea, no vomiting, no diarrhea, no constipation  	•	GENITO-URINARY - no discharge, no dysuria; no hematuria, no increased urinary frequency  	•	MUSCULOSKELETAL - no joint paint, no swelling, no redness  	•	NEUROLOGIC - no weakness, no headache, no paresthesias, no LOC  	•	PSYCH - no anxiety, non suicidal, non homicidal, no hallucination, no depression

## 2021-11-29 NOTE — ED PROVIDER NOTE - OBJECTIVE STATEMENT
this is 87 yo female who presented for evaluation. patient. this is 87 yo female who presented for evaluation. patient presents for shortness of breath today . patient states she has been fighting upper respiratory for few days.

## 2021-11-29 NOTE — ED PROVIDER NOTE - PATIENT PORTAL LINK FT
You can access the FollowMyHealth Patient Portal offered by [facility] by registering at the following website: [email or web address]. By joining [employer]’s FollowMyHealth portal, you will also be able to view your health information using other applications (apps) compatible with our system.

## 2021-11-30 LAB
ALBUMIN SERPL ELPH-MCNC: 4.3 G/DL — SIGNIFICANT CHANGE UP (ref 3.5–5.2)
ALP SERPL-CCNC: 95 U/L — SIGNIFICANT CHANGE UP (ref 30–115)
ALT FLD-CCNC: 11 U/L — SIGNIFICANT CHANGE UP (ref 0–41)
ANION GAP SERPL CALC-SCNC: 16 MMOL/L — HIGH (ref 7–14)
AST SERPL-CCNC: 22 U/L — SIGNIFICANT CHANGE UP (ref 0–41)
BILIRUB SERPL-MCNC: 0.5 MG/DL — SIGNIFICANT CHANGE UP (ref 0.2–1.2)
BUN SERPL-MCNC: 11 MG/DL — SIGNIFICANT CHANGE UP (ref 10–20)
CALCIUM SERPL-MCNC: 8.8 MG/DL — SIGNIFICANT CHANGE UP (ref 8.5–10.1)
CHLORIDE SERPL-SCNC: 95 MMOL/L — LOW (ref 98–110)
CO2 SERPL-SCNC: 20 MMOL/L — SIGNIFICANT CHANGE UP (ref 17–32)
CREAT SERPL-MCNC: 0.6 MG/DL — LOW (ref 0.7–1.5)
GLUCOSE SERPL-MCNC: 150 MG/DL — HIGH (ref 70–99)
HCT VFR BLD CALC: 34.6 % — LOW (ref 37–47)
HGB BLD-MCNC: 11.1 G/DL — LOW (ref 12–16)
MCHC RBC-ENTMCNC: 25.9 PG — LOW (ref 27–31)
MCHC RBC-ENTMCNC: 32.1 G/DL — SIGNIFICANT CHANGE UP (ref 32–37)
MCV RBC AUTO: 80.8 FL — LOW (ref 81–99)
NRBC # BLD: 0 /100 WBCS — SIGNIFICANT CHANGE UP (ref 0–0)
PLATELET # BLD AUTO: 347 K/UL — SIGNIFICANT CHANGE UP (ref 130–400)
POTASSIUM SERPL-MCNC: 4.1 MMOL/L — SIGNIFICANT CHANGE UP (ref 3.5–5)
POTASSIUM SERPL-SCNC: 4.1 MMOL/L — SIGNIFICANT CHANGE UP (ref 3.5–5)
PROT SERPL-MCNC: 6.6 G/DL — SIGNIFICANT CHANGE UP (ref 6–8)
RBC # BLD: 4.28 M/UL — SIGNIFICANT CHANGE UP (ref 4.2–5.4)
RBC # FLD: 18.3 % — HIGH (ref 11.5–14.5)
SODIUM SERPL-SCNC: 131 MMOL/L — LOW (ref 135–146)
WBC # BLD: 7.09 K/UL — SIGNIFICANT CHANGE UP (ref 4.8–10.8)
WBC # FLD AUTO: 7.09 K/UL — SIGNIFICANT CHANGE UP (ref 4.8–10.8)

## 2021-11-30 PROCEDURE — 99233 SBSQ HOSP IP/OBS HIGH 50: CPT

## 2021-11-30 RX ADMIN — PANTOPRAZOLE SODIUM 40 MILLIGRAM(S): 20 TABLET, DELAYED RELEASE ORAL at 06:18

## 2021-11-30 RX ADMIN — SIMVASTATIN 20 MILLIGRAM(S): 20 TABLET, FILM COATED ORAL at 21:40

## 2021-11-30 RX ADMIN — BUDESONIDE AND FORMOTEROL FUMARATE DIHYDRATE 2 PUFF(S): 160; 4.5 AEROSOL RESPIRATORY (INHALATION) at 21:41

## 2021-11-30 RX ADMIN — Medication 60 MILLIGRAM(S): at 18:11

## 2021-11-30 RX ADMIN — Medication 60 MILLIGRAM(S): at 05:11

## 2021-11-30 RX ADMIN — AMLODIPINE BESYLATE 5 MILLIGRAM(S): 2.5 TABLET ORAL at 05:10

## 2021-11-30 NOTE — DIETITIAN INITIAL EVALUATION ADULT. - OTHER INFO
pt is 86 year old female with hx of COPD, HTN, p/w SOB found to have RSV with COPD exacerbation.  presently on droplet precautions

## 2021-11-30 NOTE — DIETITIAN INITIAL EVALUATION ADULT. - ORAL INTAKE PTA/DIET HISTORY
as per pt generally has a good po intake, weight has been stable for last two years. occasionally will drink ensure

## 2021-11-30 NOTE — DIETITIAN INITIAL EVALUATION ADULT. - PERTINENT MEDS FT
MEDICATIONS  (STANDING):  amLODIPine   Tablet 5 milliGRAM(s) Oral daily  budesonide 160 MICROgram(s)/formoterol 4.5 MICROgram(s) Inhaler 2 Puff(s) Inhalation two times a day  chlorhexidine 4% Liquid 1 Application(s) Topical <User Schedule>  enoxaparin Injectable 40 milliGRAM(s) SubCutaneous at bedtime  methylPREDNISolone sodium succinate Injectable 60 milliGRAM(s) IV Push every 12 hours  pantoprazole    Tablet 40 milliGRAM(s) Oral before breakfast  simvastatin 20 milliGRAM(s) Oral at bedtime    MEDICATIONS  (PRN):  acetaminophen     Tablet .. 650 milliGRAM(s) Oral every 6 hours PRN Temp greater or equal to 38C (100.4F), Mild Pain (1 - 3)  albuterol/ipratropium for Nebulization 3 milliLiter(s) Nebulizer every 6 hours PRN Bronchospasm  guaifenesin/dextromethorphan Oral Liquid 10 milliLiter(s) Oral every 6 hours PRN Cough  melatonin 3 milliGRAM(s) Oral at bedtime PRN Insomnia  ondansetron Injectable 4 milliGRAM(s) IV Push every 8 hours PRN Nausea and/or Vomiting

## 2021-11-30 NOTE — PROGRESS NOTE ADULT - ASSESSMENT
86F w/PMHx of COPD not on home o2, HTN presents to ED with complaints of SOB.  Patient reports symptoms began approximately 3-4 days ago with URI type symptoms, found to have RSV infection with COPD exacerbation    #Acute Hypoxic Respiratory Failure 2/2 RSV Bronchitis causing COPD Exacerbation   - Pulmonary Consult called will see pt 12/1st/2021  - Oxygen 2L NC (check sats RA ambulation to determine o2 need however pt has acute RSV infection which may cause some hypoxia on volnerable lungs)   - Droplet Standard Precautions for RSV   - c/w Solumedrol 60mg IVP q12h today, monitor on ISS to cover for steroid induced hyperglycemia  - b2 PRN q6h  - start LABA/ICS  - Robitussin DM 10ml q6h PRN  - DVT/GI PPX (Lovenox/Protonix)  - CHG 4% QD and PRN     #HTN  - c/w Amlodipine at home doses  - will hold metoprolol given beta blockers can contribute to sob  - DASH diet    #HLD  - c/w simvastatin  - DASH diet    Social: case d/w pt and  at bedside in detail. Pulmonologist Dr Teran retired. New Pulm consult requested will see in am     Dispo: Possible d/c in 24hrs

## 2021-12-01 ENCOUNTER — TRANSCRIPTION ENCOUNTER (OUTPATIENT)
Age: 86
End: 2021-12-01

## 2021-12-01 DIAGNOSIS — J44.1 CHRONIC OBSTRUCTIVE PULMONARY DISEASE WITH (ACUTE) EXACERBATION: ICD-10-CM

## 2021-12-01 DIAGNOSIS — B97.4 RESPIRATORY SYNCYTIAL VIRUS AS THE CAUSE OF DISEASES CLASSIFIED ELSEWHERE: ICD-10-CM

## 2021-12-01 PROCEDURE — 99222 1ST HOSP IP/OBS MODERATE 55: CPT

## 2021-12-01 PROCEDURE — 99233 SBSQ HOSP IP/OBS HIGH 50: CPT

## 2021-12-01 RX ADMIN — Medication 60 MILLIGRAM(S): at 17:56

## 2021-12-01 RX ADMIN — BUDESONIDE AND FORMOTEROL FUMARATE DIHYDRATE 2 PUFF(S): 160; 4.5 AEROSOL RESPIRATORY (INHALATION) at 09:18

## 2021-12-01 RX ADMIN — Medication 10 MILLILITER(S): at 14:48

## 2021-12-01 RX ADMIN — Medication 10 MILLILITER(S): at 21:29

## 2021-12-01 RX ADMIN — Medication 10 MILLILITER(S): at 02:59

## 2021-12-01 RX ADMIN — AMLODIPINE BESYLATE 5 MILLIGRAM(S): 2.5 TABLET ORAL at 05:57

## 2021-12-01 RX ADMIN — Medication 60 MILLIGRAM(S): at 05:57

## 2021-12-01 RX ADMIN — BUDESONIDE AND FORMOTEROL FUMARATE DIHYDRATE 2 PUFF(S): 160; 4.5 AEROSOL RESPIRATORY (INHALATION) at 21:28

## 2021-12-01 RX ADMIN — PANTOPRAZOLE SODIUM 40 MILLIGRAM(S): 20 TABLET, DELAYED RELEASE ORAL at 05:57

## 2021-12-01 RX ADMIN — SIMVASTATIN 20 MILLIGRAM(S): 20 TABLET, FILM COATED ORAL at 21:25

## 2021-12-01 NOTE — DISCHARGE NOTE PROVIDER - NSDCFUADDAPPT_GEN_ALL_CORE_FT
please follow up with your doctor, and pulmonary in one week  please come back to the hospital if you develop any new symptoms or concerns

## 2021-12-01 NOTE — CONSULT NOTE ADULT - ASSESSMENT
Impression:  copd exacerbation secondary to   RSV infection         Plan:  continue solumedrol Q 12 hrs   nebulizer Q 4 hrs   check pox on RA rest and exertion   keep pox > 92 %   possible andrea d/c on prednisone taper and symbicort Q 12 hrs 160 instead of wixela which she did not like it   follow in 3-4 weeks

## 2021-12-01 NOTE — CONSULT NOTE ADULT - SUBJECTIVE AND OBJECTIVE BOX
Patient is a 86y old  Female who presents with a chief complaint of SOB x 1 day (30 Nov 2021 16:05)      HPI:  86F w/PMHx of COPD not on home o2, HTN presents to ED with complaints of SOB.  Patient reports symptoms began approximately 3-4 days ago with URI type symptoms.  She reports she woke up this AM with acute SOB and came to the ED for evaluation.  Patient reports +SOB, ANGEL.  + cough, worsened from baseline with scant clear production.  Reports + NC, patient had a sore throat but it resolved.  No fever/chills.  No CP.  No abdominal or urinary complaints  Patient with hx COPD, not on maintenance inhalers.  Follows with Dr. Wright.   (29 Nov 2021 18:25)  patient supposed to be on wixela by she was not taking it     PAST MEDICAL & SURGICAL HISTORY:  HTN (hypertension)    Hypercholesterolemia    COPD, mild    History of fusion of cervical spine        FAMILY HISTORY:    Family history: No family cardiovascular system   Occupation:  Alochol: Denied  Smoking: Denied  Drug Use: Denied  Marital Status:           Allergies    No Known Allergies    Intolerances    codeine (Nausea)      Home Medications:  Combivent 18 mcg-103 mcg-/inh inhalation aerosol:  (29 Nov 2021 18:30)  Metoprolol Tartrate 25 mg oral tablet: 1 tab(s) orally 2 times a day (29 Nov 2021 18:30)  Norvasc 5 mg oral tablet: 1 tab(s) orally once a day (29 Nov 2021 18:30)  Zocor 20 mg oral tablet: 1 tab(s) orally once a day (at bedtime) (29 Nov 2021 18:30)      ROS: as in HPI; All other systems reviewed are negative        PHYSICAL EXAM:  Vital Signs Last 24 Hrs  T(C): 36 (01 Dec 2021 05:14), Max: 37.3 (30 Nov 2021 13:30)  T(F): 96.8 (01 Dec 2021 05:14), Max: 99.1 (30 Nov 2021 13:30)  HR: 81 (01 Dec 2021 05:14) (81 - 98)  BP: 113/55 (01 Dec 2021 05:14) (112/55 - 132/60)  BP(mean): --  RR: 18 (01 Dec 2021 05:14) (18 - 20)  SpO2: 93% (01 Dec 2021 09:16) (93% - 98%)      GENERAL: NAD, well-groomed, well-developed  HEAD:  Atraumatic, Normocephalic  EYES: EOMI, PERRLA, conjunctiva and sclera clear  ENMT: No tonsillar erythema, exudates, or enlargement; Moist mucous membranes, Good dentition, No lesions  NECK: Supple, No JVD, Normal thyroid  NERVOUS SYSTEM:  Alert & Oriented X3, Good concentration; Motor Strength 5/5 B/L upper and lower extremities; DTRs 2+ intact and symmetric  CHEST/LUNG: b/l wheeze   HEART: Regular rate and rhythm; No murmurs, rubs, or gallops  ABDOMEN: Soft, Nontender, Nondistended; Bowel sounds present  EXTREMITIES:  2+ Peripheral Pulses, No clubbing, cyanosis, or edema  LYMPH: No lymphadenopathy noted  SKIN: No rashes or lesions    HOSPITAL MEDICATIONS:  MEDICATIONS  (STANDING):  amLODIPine   Tablet 5 milliGRAM(s) Oral daily  budesonide 160 MICROgram(s)/formoterol 4.5 MICROgram(s) Inhaler 2 Puff(s) Inhalation two times a day  chlorhexidine 4% Liquid 1 Application(s) Topical <User Schedule>  enoxaparin Injectable 40 milliGRAM(s) SubCutaneous at bedtime  methylPREDNISolone sodium succinate Injectable 60 milliGRAM(s) IV Push every 12 hours  pantoprazole    Tablet 40 milliGRAM(s) Oral before breakfast  simvastatin 20 milliGRAM(s) Oral at bedtime    MEDICATIONS  (PRN):  acetaminophen     Tablet .. 650 milliGRAM(s) Oral every 6 hours PRN Temp greater or equal to 38C (100.4F), Mild Pain (1 - 3)  albuterol/ipratropium for Nebulization 3 milliLiter(s) Nebulizer every 6 hours PRN Bronchospasm  guaifenesin/dextromethorphan Oral Liquid 10 milliLiter(s) Oral every 6 hours PRN Cough  melatonin 3 milliGRAM(s) Oral at bedtime PRN Insomnia  ondansetron Injectable 4 milliGRAM(s) IV Push every 8 hours PRN Nausea and/or Vomiting      LABS:                        11.1   7.09  )-----------( 347      ( 30 Nov 2021 07:08 )             34.6     11-30    131<L>  |  95<L>  |  11  ----------------------------<  150<H>  4.1   |  20  |  0.6<L>    Ca    8.8      30 Nov 2021 07:08    TPro  6.6  /  Alb  4.3  /  TBili  0.5  /  DBili  x   /  AST  22  /  ALT  11  /  AlkPhos  95  11-30    PT/INR - ( 29 Nov 2021 15:59 )   PT: 11.10 sec;   INR: 0.96 ratio         PTT - ( 29 Nov 2021 15:59 )  PTT:31.4 sec              RADIOLOGY: no infiltrate   [ ] Reviewed and interpreted by me    ECHO:    Point of Care Ultrasound Findings;    PFT:

## 2021-12-01 NOTE — PROGRESS NOTE ADULT - ASSESSMENT
86F w/PMHx of COPD not on home o2, HTN presents to ED with complaints of SOB.  Patient reports symptoms began approximately 3-4 days ago with URI type symptoms, found to have RSV infection with COPD exacerbation    #Acute Hypoxic Respiratory Failure 2/2 RSV Bronchitis causing COPD Exacerbation   - Improving  -Continue with IV steriod, neb tx , and home inhaler  -Off oxygen   - Droplet Standard Precautions for RSV   - Robitussin DM 10ml q6h PRN  - DVT/GI PPX (Lovenox/Protonix)  - CHG 4% QD and PRN     #HTN  - c/w Amlodipine at home doses  - will hold metoprolol given beta blockers can contribute to sob  - DASH diet    #HLD  - c/w simvastatin  - DASH diet    #Progress Note Handoff  Pending (specify):  anticipate for tomorrow  Family discussion:  plan of care was discussed with patient and family in details.  all questions were answered.  seems to understand, and in agreement  Disposition:  unknown

## 2021-12-01 NOTE — DISCHARGE NOTE PROVIDER - NSDCACTIVITY_GEN_ALL_CORE
No restrictions Do not drive or operate machinery/Do not make important decisions/No heavy lifting/straining

## 2021-12-01 NOTE — DISCHARGE NOTE PROVIDER - NSDCMRMEDTOKEN_GEN_ALL_CORE_FT
Combivent 18 mcg-103 mcg-/inh inhalation aerosol:   Metoprolol Tartrate 25 mg oral tablet: 1 tab(s) orally 2 times a day  Norvasc 5 mg oral tablet: 1 tab(s) orally once a day  Zocor 20 mg oral tablet: 1 tab(s) orally once a day (at bedtime)   albuterol 90 mcg/inh inhalation aerosol: 2 puff(s) inhaled every 4 hours, As Needed   budesonide-formoterol 160 mcg-4.5 mcg/inh inhalation aerosol: 2 puff(s) inhaled 2 times a day   guaifenesin-dextromethorphan 100 mg-10 mg/5 mL oral liquid: 10 milliliter(s) orally every 6 hours, As needed, Cough  ipratropium-albuterol 0.5 mg-2.5 mg/3 mL inhalation solution: 3 milliliter(s) inhaled every 6 hours, As Needed   melatonin 3 mg oral tablet: 1 tab(s) orally once a day (at bedtime), As needed, Insomnia  Norvasc 5 mg oral tablet: 1 tab(s) orally once a day  pantoprazole 40 mg oral delayed release tablet: 1 tab(s) orally once a day (before a meal)  predniSONE 10 mg oral tablet: 4 tab(s) orally once a day x 3 days  decrease by one tablets every 3 days  Zocor 20 mg oral tablet: 1 tab(s) orally once a day (at bedtime)   albuterol 90 mcg/inh inhalation aerosol: 2 puff(s) inhaled every 4 hours, As Needed   budesonide-formoterol 160 mcg-4.5 mcg/inh inhalation aerosol: 2 puff(s) inhaled 2 times a day   guaifenesin-dextromethorphan 100 mg-10 mg/5 mL oral liquid: 10 milliliter(s) orally every 6 hours, As needed, Cough  ipratropium-albuterol 0.5 mg-2.5 mg/3 mL inhalation solution: 3 milliliter(s) inhaled every 6 hours, As Needed   melatonin 3 mg oral tablet: 1 tab(s) orally once a day (at bedtime), As needed, Insomnia  nebulizer machine: 1 inhaler(s) inhaled every 8 hours   Norvasc 5 mg oral tablet: 1 tab(s) orally once a day  pantoprazole 40 mg oral delayed release tablet: 1 tab(s) orally once a day (before a meal)  predniSONE 10 mg oral tablet: 6 tab(s) orally once a day x 3 days  decrease by one tablet every 3 days  Zocor 20 mg oral tablet: 1 tab(s) orally once a day (at bedtime)

## 2021-12-01 NOTE — DISCHARGE NOTE PROVIDER - HOSPITAL COURSE
86F w/PMHx of COPD not on home o2, HTN presents to ED with complaints of SOB.  Patient reports symptoms began approximately 3-4 days ago with URI type symptoms.  She reports she woke up this AM with acute SOB and came to the ED for evaluation.  Patient reports +SOB, ANGEL.  + cough, worsened from baseline with scant clear production.  Reports + NC, patient had a sore throat but it resolved.  No fever/chills.  No CP.  No abdominal or urinary complaints  Patient with hx COPD, not on maintenance inhalers.     Patient admitted to medicine service, started on IV steroids, b2 and LABA/ICS.  Pulmonology consulted and guided Rx.  Patient symptomatically improved, able to be weaned off o2 and discahrged home on prednisone taper.  Patient to FU outpatient with PMD and Pulmonology  86F w/PMHx of COPD not on home o2, HTN presents to ED with complaints of SOB.  Patient reports symptoms began approximately 3-4 days ago with URI type symptoms, found to have RSV infection with COPD exacerbation    #Acute Hypoxic Respiratory Failure 2/2 RSV Bronchitis causing COPD Exacerbation   - Improving  -Switch to po steriod, neb tx, and current inhalers  -SPO2 is 95% On RA, and SPO2 is 93% on ra with ambulation  -Cleared by pulmonary for discharge       #HTN  - c/w Amlodipine at home doses  - will hold metoprolol given beta blockers can contribute to sob  - DASH diet    #HLD  - c/w simvastatin  - DASH diet    patient was seen and examined today  feels better.   SOB resolved  Offers no other complaints  Constitutional: No fever, fatigue or weight loss.  Skin: No rash.  Eyes: No recent vision problems or eye pain.  ENT: No congestion, ear pain, or sore throat.  Endocrine: No thyroid problems.  Cardiovascular: No chest pain or palpation.  Respiratory: No cough, shortness of breath, congestion, or wheezing.  Gastrointestinal: No abdominal pain, nausea, vomiting, or diarrhea.  Genitourinary: No dysuria.  Musculoskeletal: No joint swelling.  Neurologic: No headache.  Vital Signs Last 24 Hrs  T(C): 36 (12-02-21 @ 05:36), Max: 36.9 (12-01-21 @ 13:47)  T(F): 96.8 (12-02-21 @ 05:36), Max: 98.5 (12-01-21 @ 13:47)  HR: 86 (12-02-21 @ 05:36) (86 - 107)  BP: 101/57 (12-02-21 @ 05:36) (98/56 - 150/75)  BP(mean): --  RR: 16 (12-02-21 @ 05:36) (16 - 16)  SpO2: 90% (12-02-21 @ 10:41) (90% - 95%)  PHYSICAL EXAM-  GENERAL: NAD,   HEAD:  Atraumatic, Normocephalic  EYES: EOMI, PERRLA, conjunctiva and sclera clear  NECK: Supple, No JVD, Normal thyroid  NERVOUS SYSTEM:  Alert & Oriented X3, Moving all extremities  CHEST/LUNG: Clear to percussion bilaterally; No rales, rhonchi, wheezing, or rubs  HEART: Regular rate and rhythm; No murmurs, rubs, or gallops  ABDOMEN: Soft, Nontender, Nondistended; Bowel sounds present  EXTREMITIES:   , No clubbing, cyanosis, or edema  SKIN: No rashes or lesions  Hospital course, and discharge planning were discussed with patient,  in details.  all questions were answered.  seems to understand, and in agreement.  time 70 min                                      MEDICATIONS  (STANDING):  albuterol/ipratropium for Nebulization 3 milliLiter(s) Nebulizer every 6 hours  amLODIPine   Tablet 5 milliGRAM(s) Oral daily  budesonide 160 MICROgram(s)/formoterol 4.5 MICROgram(s) Inhaler 2 Puff(s) Inhalation two times a day  chlorhexidine 4% Liquid 1 Application(s) Topical <User Schedule>  enoxaparin Injectable 40 milliGRAM(s) SubCutaneous at bedtime  methylPREDNISolone sodium succinate Injectable 60 milliGRAM(s) IV Push every 12 hours  pantoprazole    Tablet 40 milliGRAM(s) Oral before breakfast  simvastatin 20 milliGRAM(s) Oral at bedtime    MEDICATIONS  (PRN):  acetaminophen     Tablet .. 650 milliGRAM(s) Oral every 6 hours PRN Temp greater or equal to 38C (100.4F), Mild Pain (1 - 3)  guaifenesin/dextromethorphan Oral Liquid 10 milliLiter(s) Oral every 6 hours PRN Cough  melatonin 3 milliGRAM(s) Oral at bedtime PRN Insomnia  ondansetron Injectable 4 milliGRAM(s) IV Push every 8 hours PRN Nausea and/or Vomiting          RADIOLOGY RESULTS:    86F w/PMHx of COPD not on home o2, HTN presents to ED with complaints of SOB.  Patient reports symptoms began approximately 3-4 days ago with URI type symptoms, found to have RSV infection with COPD exacerbation    #Acute Hypoxic Respiratory Failure 2/2 RSV Bronchitis causing COPD Exacerbation   - Improving  -Switch to po steriod, neb tx, and current inhalers  -SPO2 is 95% On RA, and SPO2 is 93% on ra with ambulation  -Cleared by pulmonary for discharge       #HTN  - c/w Amlodipine at home doses  - will hold metoprolol given beta blockers can contribute to sob  - DASH diet    #HLD  - c/w simvastatin  - DASH diet    patient was seen and examined today  feels better.   SOB resolved  Offers no other complaints  Constitutional: No fever, fatigue or weight loss.  Skin: No rash.  Eyes: No recent vision problems or eye pain.  ENT: No congestion, ear pain, or sore throat.  Endocrine: No thyroid problems.  Cardiovascular: No chest pain or palpation.  Respiratory: No cough, shortness of breath, congestion, or wheezing.  Gastrointestinal: No abdominal pain, nausea, vomiting, or diarrhea.  Genitourinary: No dysuria.  Musculoskeletal: No joint swelling.  Neurologic: No headache.  Vital Signs Last 24 Hrs  T(C): 36.2 (03 Dec 2021 05:00), Max: 36.2 (03 Dec 2021 05:00)  T(F): 97.1 (03 Dec 2021 05:00), Max: 97.1 (03 Dec 2021 05:00)  HR: 97 (03 Dec 2021 05:00) (83 - 102)  BP: 105/60 (03 Dec 2021 05:00) (105/60 - 117/58)  BP(mean): --  RR: 16 (03 Dec 2021 05:00) (16 - 16)  SpO2: 95% (02 Dec 2021 22:09) (93% - 95%)  PHYSICAL EXAM-  GENERAL: NAD,   HEAD:  Atraumatic, Normocephalic  EYES: EOMI, PERRLA, conjunctiva and sclera clear  NECK: Supple, No JVD, Normal thyroid  NERVOUS SYSTEM:  Alert & Oriented X3, Moving all extremities  CHEST/LUNG: Clear to percussion bilaterally; No rales, rhonchi, wheezing, or rubs  HEART: Regular rate and rhythm; No murmurs, rubs, or gallops  ABDOMEN: Soft, Nontender, Nondistended; Bowel sounds present  EXTREMITIES:   , No clubbing, cyanosis, or edema  SKIN: No rashes or lesions  Hospital course, and discharge planning were discussed with patient,  in details.  all questions were answered.  seems to understand, and in agreement.  time 70 min                                      MEDICATIONS  (STANDING):  albuterol/ipratropium for Nebulization 3 milliLiter(s) Nebulizer every 6 hours  amLODIPine   Tablet 5 milliGRAM(s) Oral daily  budesonide 160 MICROgram(s)/formoterol 4.5 MICROgram(s) Inhaler 2 Puff(s) Inhalation two times a day  chlorhexidine 4% Liquid 1 Application(s) Topical <User Schedule>  enoxaparin Injectable 40 milliGRAM(s) SubCutaneous at bedtime  methylPREDNISolone sodium succinate Injectable 60 milliGRAM(s) IV Push every 12 hours  pantoprazole    Tablet 40 milliGRAM(s) Oral before breakfast  simvastatin 20 milliGRAM(s) Oral at bedtime    MEDICATIONS  (PRN):  acetaminophen     Tablet .. 650 milliGRAM(s) Oral every 6 hours PRN Temp greater or equal to 38C (100.4F), Mild Pain (1 - 3)  guaifenesin/dextromethorphan Oral Liquid 10 milliLiter(s) Oral every 6 hours PRN Cough  melatonin 3 milliGRAM(s) Oral at bedtime PRN Insomnia  ondansetron Injectable 4 milliGRAM(s) IV Push every 8 hours PRN Nausea and/or Vomiting          RADIOLOGY RESULTS:

## 2021-12-01 NOTE — DISCHARGE NOTE PROVIDER - NSDCCPCAREPLAN_GEN_ALL_CORE_FT
PRINCIPAL DISCHARGE DIAGNOSIS  Diagnosis: COPD exacerbation  Assessment and Plan of Treatment: You were treated for a COPD exacerbation.  You received IV steroids and now will complete the course with Oral.  An rx was sent to your pharmacy for taht as well as a maintainence inhaler, make sure to take this as well.  FU outpatient with your PMD and Pulmonologist

## 2021-12-01 NOTE — DISCHARGE NOTE PROVIDER - CARE PROVIDER_API CALL
Malina Maldonado[address] [address]  Phone: [phone]  Fax: [phone]  Follow Up Time:     Ryan Wright)  Critical Care Medicine; Internal Medicine; Pulmonary Disease  [address] [place] [address]  Phone: [phone]  Fax: [phone]  Follow Up Time:

## 2021-12-02 PROCEDURE — 99232 SBSQ HOSP IP/OBS MODERATE 35: CPT

## 2021-12-02 RX ORDER — METOPROLOL TARTRATE 50 MG
1 TABLET ORAL
Qty: 0 | Refills: 0 | DISCHARGE

## 2021-12-02 RX ORDER — IPRATROPIUM/ALBUTEROL SULFATE 18-103MCG
0 AEROSOL WITH ADAPTER (GRAM) INHALATION
Qty: 0 | Refills: 0 | DISCHARGE

## 2021-12-02 RX ORDER — IPRATROPIUM/ALBUTEROL SULFATE 18-103MCG
3 AEROSOL WITH ADAPTER (GRAM) INHALATION
Qty: 1 | Refills: 0
Start: 2021-12-02 | End: 2021-12-31

## 2021-12-02 RX ORDER — PANTOPRAZOLE SODIUM 20 MG/1
1 TABLET, DELAYED RELEASE ORAL
Qty: 30 | Refills: 0
Start: 2021-12-02 | End: 2021-12-31

## 2021-12-02 RX ORDER — ALBUTEROL 90 UG/1
2 AEROSOL, METERED ORAL
Qty: 1 | Refills: 0
Start: 2021-12-02 | End: 2021-12-31

## 2021-12-02 RX ORDER — BUDESONIDE AND FORMOTEROL FUMARATE DIHYDRATE 160; 4.5 UG/1; UG/1
2 AEROSOL RESPIRATORY (INHALATION)
Qty: 1 | Refills: 0
Start: 2021-12-02 | End: 2021-12-31

## 2021-12-02 RX ORDER — IPRATROPIUM/ALBUTEROL SULFATE 18-103MCG
2 AEROSOL WITH ADAPTER (GRAM) INHALATION
Qty: 1 | Refills: 0
Start: 2021-12-02 | End: 2021-12-31

## 2021-12-02 RX ORDER — IPRATROPIUM/ALBUTEROL SULFATE 18-103MCG
3 AEROSOL WITH ADAPTER (GRAM) INHALATION EVERY 6 HOURS
Refills: 0 | Status: DISCONTINUED | OUTPATIENT
Start: 2021-12-02 | End: 2021-12-03

## 2021-12-02 RX ORDER — GUAIFENESIN/DEXTROMETHORPHAN 600MG-30MG
10 TABLET, EXTENDED RELEASE 12 HR ORAL
Qty: 280 | Refills: 0
Start: 2021-12-02 | End: 2021-12-08

## 2021-12-02 RX ORDER — IPRATROPIUM/ALBUTEROL SULFATE 18-103MCG
3 AEROSOL WITH ADAPTER (GRAM) INHALATION EVERY 6 HOURS
Refills: 0 | Status: DISCONTINUED | OUTPATIENT
Start: 2021-12-02 | End: 2021-12-02

## 2021-12-02 RX ORDER — LANOLIN ALCOHOL/MO/W.PET/CERES
1 CREAM (GRAM) TOPICAL
Qty: 30 | Refills: 0
Start: 2021-12-02 | End: 2021-12-31

## 2021-12-02 RX ADMIN — Medication 10 MILLILITER(S): at 06:09

## 2021-12-02 RX ADMIN — AMLODIPINE BESYLATE 5 MILLIGRAM(S): 2.5 TABLET ORAL at 06:07

## 2021-12-02 RX ADMIN — SIMVASTATIN 20 MILLIGRAM(S): 20 TABLET, FILM COATED ORAL at 21:26

## 2021-12-02 RX ADMIN — Medication 3 MILLILITER(S): at 19:38

## 2021-12-02 RX ADMIN — Medication 3 MILLILITER(S): at 13:22

## 2021-12-02 RX ADMIN — PANTOPRAZOLE SODIUM 40 MILLIGRAM(S): 20 TABLET, DELAYED RELEASE ORAL at 06:07

## 2021-12-02 RX ADMIN — ENOXAPARIN SODIUM 40 MILLIGRAM(S): 100 INJECTION SUBCUTANEOUS at 21:26

## 2021-12-02 RX ADMIN — Medication 60 MILLIGRAM(S): at 06:07

## 2021-12-02 RX ADMIN — BUDESONIDE AND FORMOTEROL FUMARATE DIHYDRATE 2 PUFF(S): 160; 4.5 AEROSOL RESPIRATORY (INHALATION) at 08:00

## 2021-12-02 RX ADMIN — Medication 10 MILLILITER(S): at 22:39

## 2021-12-02 NOTE — PHYSICAL THERAPY INITIAL EVALUATION ADULT - ADDITIONAL COMMENTS
Per patient, resides in private home with spouse. +14 steps to enter her house and +14 steps to get to her bedroom. Patient is independent with ADLs and ambulation at baseline.

## 2021-12-02 NOTE — PHYSICAL THERAPY INITIAL EVALUATION ADULT - IMPAIRED TRANSFERS: SIT/STAND, REHAB EVAL
decreased endurance/impaired balance/decreased flexibility/narrow base of support/impaired postural control/decreased strength

## 2021-12-02 NOTE — PROGRESS NOTE ADULT - ASSESSMENT
Impression:  COPD exacerbation  RSV infection       Plan:  Continue Solumedrol 60mg q12h today  Duoneb q4h ATC and prn  Pulse Ox on RA at rest and ambulation  Target POx 88 - 92%  Possible d/c tomorrow on Prednisone taper and Symbicort q12h (doesn't like Wyxela)  FU in 3 - 4 weeks Impression:  COPD exacerbation  RSV infection       Plan:  Continue Solumedrol 60mg today  Can discharge on Prednisone taper  Duoneb q4h ATC and prn  Pulse Ox on RA at rest and ambulation  Target POx 88 - 92%  Symbicort q12h (doesn't like Wyxela)  FU in 3 - 4 weeks

## 2021-12-02 NOTE — PHYSICAL THERAPY INITIAL EVALUATION ADULT - GENERAL OBSERVATIONS, REHAB EVAL
10:25 - 10:45. Chart reviewed. Patient available to be seen for physical therapy, confirmed with nurse. Patient encountered already sitting at edge of bed. Denies pain at rest and is ready for PT evaluation now.

## 2021-12-02 NOTE — PHYSICAL THERAPY INITIAL EVALUATION ADULT - LEVEL OF INDEPENDENCE: STAIR NEGOTIATION, REHAB EVAL
N/A - did not occur this session , patient became SOB with ambulation on room air ; will assess as appropriate and if needed

## 2021-12-02 NOTE — PHYSICAL THERAPY INITIAL EVALUATION ADULT - GAIT DEVIATIONS NOTED, PT EVAL
forward flexed posture, decreased heel strike/toe off/decreased leonardo/increased time in double stance/decreased step length/decreased weight-shifting ability

## 2021-12-03 ENCOUNTER — TRANSCRIPTION ENCOUNTER (OUTPATIENT)
Age: 86
End: 2021-12-03

## 2021-12-03 VITALS
SYSTOLIC BLOOD PRESSURE: 105 MMHG | RESPIRATION RATE: 16 BRPM | HEART RATE: 97 BPM | TEMPERATURE: 97 F | DIASTOLIC BLOOD PRESSURE: 60 MMHG

## 2021-12-03 PROCEDURE — 99239 HOSP IP/OBS DSCHRG MGMT >30: CPT

## 2021-12-03 PROCEDURE — 99232 SBSQ HOSP IP/OBS MODERATE 35: CPT

## 2021-12-03 RX ADMIN — PANTOPRAZOLE SODIUM 40 MILLIGRAM(S): 20 TABLET, DELAYED RELEASE ORAL at 04:54

## 2021-12-03 RX ADMIN — Medication 3 MILLILITER(S): at 08:38

## 2021-12-03 RX ADMIN — Medication 10 MILLILITER(S): at 04:55

## 2021-12-03 RX ADMIN — Medication 3 MILLILITER(S): at 01:40

## 2021-12-03 RX ADMIN — CHLORHEXIDINE GLUCONATE 1 APPLICATION(S): 213 SOLUTION TOPICAL at 04:54

## 2021-12-03 NOTE — PROGRESS NOTE ADULT - SUBJECTIVE AND OBJECTIVE BOX
CC.  COPD exacerbation  HPI.  Pt is feeling better.  SOB with ambulation.  cough is  improving.    offers no new complaints               Constitutional: No fever, fatigue or weight loss.  Skin: No rash.  Eyes: No recent vision problems or eye pain.  ENT: No congestion, ear pain, or sore throat.  Endocrine: No thyroid problems.  Cardiovascular: No chest pain or palpation.  Respiratory: No   congestion, or wheezing.  Gastrointestinal: No abdominal pain, nausea, vomiting, or diarrhea.  Genitourinary: No dysuria.  Musculoskeletal: No joint swelling.  Neurologic: No headache.      Vital Signs Last 24 Hrs  T(C): 36 (02 Dec 2021 05:36), Max: 36.9 (01 Dec 2021 13:47)  T(F): 96.8 (02 Dec 2021 05:36), Max: 98.5 (01 Dec 2021 13:47)  HR: 86 (02 Dec 2021 05:36) (86 - 107)  BP: 101/57 (02 Dec 2021 05:36) (98/56 - 150/75)  BP(mean): --  RR: 16 (02 Dec 2021 05:36) (16 - 16)  SpO2: 90% (02 Dec 2021 10:41) (90% - 95%)        PHYSICAL EXAM-  GENERAL: NAD,   HEAD:  Atraumatic, Normocephalic  EYES: EOMI, PERRLA, conjunctiva and sclera clear  NECK: Supple, No JVD, Normal thyroid  NERVOUS SYSTEM:  Alert & Oriented X3, Motor Strength 5/5 B/L upper and lower extremities; DTRs 2+ intact and symmetric  CHEST/LUNG: Min wheezing with good air entry  HEART: Regular rate and rhythm; No murmurs, rubs, or gallops  ABDOMEN: Soft, Nontender, Nondistended; Bowel sounds present  EXTREMITIES:  2+ Peripheral Pulses, No clubbing, cyanosis, or edema  SKIN: No rashes or lesions                   MEDICATIONS  (STANDING):  amLODIPine   Tablet 5 milliGRAM(s) Oral daily  budesonide 160 MICROgram(s)/formoterol 4.5 MICROgram(s) Inhaler 2 Puff(s) Inhalation two times a day  chlorhexidine 4% Liquid 1 Application(s) Topical <User Schedule>  enoxaparin Injectable 40 milliGRAM(s) SubCutaneous at bedtime  methylPREDNISolone sodium succinate Injectable 60 milliGRAM(s) IV Push every 12 hours  pantoprazole    Tablet 40 milliGRAM(s) Oral before breakfast  simvastatin 20 milliGRAM(s) Oral at bedtime    MEDICATIONS  (PRN):  acetaminophen     Tablet .. 650 milliGRAM(s) Oral every 6 hours PRN Temp greater or equal to 38C (100.4F), Mild Pain (1 - 3)  albuterol/ipratropium for Nebulization 3 milliLiter(s) Nebulizer every 6 hours PRN Bronchospasm  guaifenesin/dextromethorphan Oral Liquid 10 milliLiter(s) Oral every 6 hours PRN Cough  melatonin 3 milliGRAM(s) Oral at bedtime PRN Insomnia  ondansetron Injectable 4 milliGRAM(s) IV Push every 8 hours PRN Nausea and/or Vomiting      Imaging Personally Reviewed:     [x ] YES  [ ] NO    Consultant(s) Notes Reviewed:  [x ] YES  [ ] NO    Care Discussed with Consultants/Other Providers [x ] YES  [ ] No medical contraindication for discharge   
CC.  COPD exacerbation  HPI.  Pt is feeling better.  SOB and cough are improving.    offers no new complaints               Constitutional: No fever, fatigue or weight loss.  Skin: No rash.  Eyes: No recent vision problems or eye pain.  ENT: No congestion, ear pain, or sore throat.  Endocrine: No thyroid problems.  Cardiovascular: No chest pain or palpation.  Respiratory: No   congestion, or wheezing.  Gastrointestinal: No abdominal pain, nausea, vomiting, or diarrhea.  Genitourinary: No dysuria.  Musculoskeletal: No joint swelling.  Neurologic: No headache.      Vital Signs Last 24 Hrs  T(C): 36 (12-01-21 @ 05:14), Max: 37.3 (11-30-21 @ 13:30)  T(F): 96.8 (12-01-21 @ 05:14), Max: 99.1 (11-30-21 @ 13:30)  HR: 81 (12-01-21 @ 05:14) (81 - 98)  BP: 113/55 (12-01-21 @ 05:14) (112/55 - 132/60)  BP(mean): --  RR: 18 (12-01-21 @ 05:14) (18 - 20)  SpO2: 93% (12-01-21 @ 09:16) (93% - 98%)        PHYSICAL EXAM-  GENERAL: NAD,   HEAD:  Atraumatic, Normocephalic  EYES: EOMI, PERRLA, conjunctiva and sclera clear  NECK: Supple, No JVD, Normal thyroid  NERVOUS SYSTEM:  Alert & Oriented X3, Motor Strength 5/5 B/L upper and lower extremities; DTRs 2+ intact and symmetric  CHEST/LUNG: Min wheezing with good air entry  HEART: Regular rate and rhythm; No murmurs, rubs, or gallops  ABDOMEN: Soft, Nontender, Nondistended; Bowel sounds present  EXTREMITIES:  2+ Peripheral Pulses, No clubbing, cyanosis, or edema  SKIN: No rashes or lesions                                  11.1   7.09  )-----------( 347      ( 30 Nov 2021 07:08 )             34.6     11-30    131<L>  |  95<L>  |  11  ----------------------------<  150<H>  4.1   |  20  |  0.6<L>    Ca    8.8      30 Nov 2021 07:08    TPro  6.6  /  Alb  4.3  /  TBili  0.5  /  DBili  x   /  AST  22  /  ALT  11  /  AlkPhos  95  11-30    CARDIAC MARKERS ( 29 Nov 2021 15:59 )  x     / <0.01 ng/mL / x     / x     / x              PT/INR - ( 29 Nov 2021 15:59 )   PT: 11.10 sec;   INR: 0.96 ratio         PTT - ( 29 Nov 2021 15:59 )  PTT:31.4 sec        MEDICATIONS  (STANDING):  amLODIPine   Tablet 5 milliGRAM(s) Oral daily  budesonide 160 MICROgram(s)/formoterol 4.5 MICROgram(s) Inhaler 2 Puff(s) Inhalation two times a day  chlorhexidine 4% Liquid 1 Application(s) Topical <User Schedule>  enoxaparin Injectable 40 milliGRAM(s) SubCutaneous at bedtime  methylPREDNISolone sodium succinate Injectable 60 milliGRAM(s) IV Push every 12 hours  pantoprazole    Tablet 40 milliGRAM(s) Oral before breakfast  simvastatin 20 milliGRAM(s) Oral at bedtime    MEDICATIONS  (PRN):  acetaminophen     Tablet .. 650 milliGRAM(s) Oral every 6 hours PRN Temp greater or equal to 38C (100.4F), Mild Pain (1 - 3)  albuterol/ipratropium for Nebulization 3 milliLiter(s) Nebulizer every 6 hours PRN Bronchospasm  guaifenesin/dextromethorphan Oral Liquid 10 milliLiter(s) Oral every 6 hours PRN Cough  melatonin 3 milliGRAM(s) Oral at bedtime PRN Insomnia  ondansetron Injectable 4 milliGRAM(s) IV Push every 8 hours PRN Nausea and/or Vomiting      Imaging Personally Reviewed:     [x ] YES  [ ] NO    Consultant(s) Notes Reviewed:  [x ] YES  [ ] NO    Care Discussed with Consultants/Other Providers [x ] YES  [ ] No medical contraindication for discharge   
  DENMARIXA  86y  Female      Patient is a 86y old  Female who presents with a chief complaint of SOB x 1 day (02 Dec 2021 13:36)        REVIEW OF SYSTEMS:  CONSTITUTIONAL: No fever, weight loss, or fatigue  EYES: No eye pain, visual disturbances, or discharge  ENMT:  No difficulty hearing, tinnitus, vertigo; No sinus or throat pain  NECK: No pain or stiffness  BREASTS: No pain, masses, or nipple discharge  RESPIRATORY: cough+,sob+  CARDIOVASCULAR: No chest pain, palpitations, dizziness, or leg swelling  GASTROINTESTINAL: No abdominal or epigastric pain. No nausea, vomiting, or hematemesis; No diarrhea or constipation. No melena or hematochezia.  GENITOURINARY: No dysuria, frequency, hematuria, or incontinence  MUSCULOSKELETAL: No joint pain or swelling; No muscle, back, or extremity pain  PSYCHIATRIC: No depression, anxiety, mood swings, or difficulty sleeping  HEME/LYMPH: No easy bruising, or bleeding gums  ALLERY AND IMMUNOLOGIC: No hives or eczema  FAMILY HISTORY:    T(C): 36.2 (12-03-21 @ 05:00), Max: 36.2 (12-03-21 @ 05:00)  HR: 97 (12-03-21 @ 05:00) (83 - 102)  BP: 105/60 (12-03-21 @ 05:00) (105/60 - 117/58)  RR: 16 (12-03-21 @ 05:00) (16 - 16)  SpO2: 93% (12-02-21 @ 22:09) (90% - 93%)  Wt(kg): --Vital Signs Last 24 Hrs  T(C): 36.2 (03 Dec 2021 05:00), Max: 36.2 (03 Dec 2021 05:00)  T(F): 97.1 (03 Dec 2021 05:00), Max: 97.1 (03 Dec 2021 05:00)  HR: 97 (03 Dec 2021 05:00) (83 - 102)  BP: 105/60 (03 Dec 2021 05:00) (105/60 - 117/58)  BP(mean): --  RR: 16 (03 Dec 2021 05:00) (16 - 16)  SpO2: 93% (02 Dec 2021 22:09) (90% - 93%)  codeine (Nausea)  No Known Allergies      PHYSICAL EXAM:  GENERAL: NAD,  HEAD:  Atraumatic, Normocephalic  EYES: EOMI, PERRLA, conjunctiva and sclera clear  ENMT: No tonsillar erythema, exudates, or enlargement;   NECK: Supple, No JVD, Normal thyroid  NERVOUS SYSTEM:  Alert & Oriented X3, Good concentration;   CHEST/LUNG: vbs bilateral ronchi+  HEART: Regular rate and rhythm; No murmurs, rubs, or gallops  ABDOMEN: Soft, Nontender, Nondistended; Bowel sounds present  EXTREMITIES:  , No clubbing, cyanosis, or edema  LYMPH: No lymphadenopathy noted  SKIN: No rashes or lesions      LABS:              RADIOLOGY & ADDITIONAL TESTS:    MEDICATION:  acetaminophen     Tablet .. 650 milliGRAM(s) Oral every 6 hours PRN  albuterol/ipratropium for Nebulization 3 milliLiter(s) Nebulizer every 6 hours  amLODIPine   Tablet 5 milliGRAM(s) Oral daily  budesonide 160 MICROgram(s)/formoterol 4.5 MICROgram(s) Inhaler 2 Puff(s) Inhalation two times a day  chlorhexidine 4% Liquid 1 Application(s) Topical <User Schedule>  enoxaparin Injectable 40 milliGRAM(s) SubCutaneous at bedtime  guaifenesin/dextromethorphan Oral Liquid 10 milliLiter(s) Oral every 6 hours PRN  melatonin 3 milliGRAM(s) Oral at bedtime PRN  ondansetron Injectable 4 milliGRAM(s) IV Push every 8 hours PRN  pantoprazole    Tablet 40 milliGRAM(s) Oral before breakfast  predniSONE   Tablet 10 milliGRAM(s) Oral two times a day  simvastatin 20 milliGRAM(s) Oral at bedtime      HEALTH ISSUES - PROBLEM Dx:  COPD exacerbation on albuterol nebulizer,add on prednisone    RSV infection nebulizer,prednisone  HTN on amlodipine will d/c home today          
Patient is a 86y old  Female who presents with a chief complaint of SOB x 1 day (01 Dec 2021 12:15)    SUBJECTIVE: Feels well. Gets short of breath on ambulation    REVIEW OF SYSTEMS:  All Pertinent ROS are negative except per HPI     PHYSICAL EXAM  Vital Signs Last 24 Hrs  T(C): 36 (02 Dec 2021 05:36), Max: 36.9 (01 Dec 2021 13:47)  T(F): 96.8 (02 Dec 2021 05:36), Max: 98.5 (01 Dec 2021 13:47)  HR: 86 (02 Dec 2021 05:36) (86 - 107)  BP: 101/57 (02 Dec 2021 05:36) (98/56 - 150/75)  RR: 16 (02 Dec 2021 05:36) (16 - 16)  SpO2: 95% (02 Dec 2021 06:11) (95% - 95%)    CONSTITUTIONAL:  Well nourished.  NAD  Cachectic    ENT:   Airway patent,   No thrush    CARDIAC:   Normal rate,   regular rhythm.    no edema    RESPIRATORY:   Decreased basilar air entry bilaterally  Expiratory wheeze bilaterally  Mild rhonchi bilaterally  Normal chest expansion  Not tachypneic,  No use of accessory muscles    GASTROINTESTINAL:  Abdomen soft,   non-tender,   no guarding,   + BS    MUSCULOSKELETAL:   range of motion is not limited,  no clubbing, cyanosis    NEUROLOGICAL:   Alert and oriented   no motor or deficits.    SKIN:   Skin normal color for race,   warm, dry   No evidence of rash.      LABS:                                                                                    MEDICATIONS  (STANDING):  albuterol/ipratropium for Nebulization 3 milliLiter(s) Nebulizer every 6 hours  amLODIPine   Tablet 5 milliGRAM(s) Oral daily  budesonide 160 MICROgram(s)/formoterol 4.5 MICROgram(s) Inhaler 2 Puff(s) Inhalation two times a day  chlorhexidine 4% Liquid 1 Application(s) Topical <User Schedule>  enoxaparin Injectable 40 milliGRAM(s) SubCutaneous at bedtime  methylPREDNISolone sodium succinate Injectable 60 milliGRAM(s) IV Push every 12 hours  pantoprazole    Tablet 40 milliGRAM(s) Oral before breakfast  simvastatin 20 milliGRAM(s) Oral at bedtime    MEDICATIONS  (PRN):  acetaminophen     Tablet .. 650 milliGRAM(s) Oral every 6 hours PRN Temp greater or equal to 38C (100.4F), Mild Pain (1 - 3)  guaifenesin/dextromethorphan Oral Liquid 10 milliLiter(s) Oral every 6 hours PRN Cough  melatonin 3 milliGRAM(s) Oral at bedtime PRN Insomnia  ondansetron Injectable 4 milliGRAM(s) IV Push every 8 hours PRN Nausea and/or Vomiting  
Patient is a 86y old  Female who presents with a chief complaint of SOB x 1 day (03 Dec 2021 08:24)    SUBJECTIVE: Feels well today.  Reports she walked and got a little bit short of breath    REVIEW OF SYSTEMS:  All Pertinent ROS are negative except per HPI     PHYSICAL EXAM  Vital Signs Last 24 Hrs  T(C): 36.2 (03 Dec 2021 05:00), Max: 36.2 (03 Dec 2021 05:00)  T(F): 97.1 (03 Dec 2021 05:00), Max: 97.1 (03 Dec 2021 05:00)  HR: 97 (03 Dec 2021 05:00) (83 - 102)  BP: 105/60 (03 Dec 2021 05:00) (105/60 - 117/58)  RR: 16 (03 Dec 2021 05:00) (16 - 16)  SpO2: 93% (02 Dec 2021 22:09) (90% - 93%)  95% at rest, 90% on ambulation    CONSTITUTIONAL:  Well nourished.  NAD  Cachectic    ENT:   Airway patent,   No thrush    CARDIAC:   Normal rate,   regular rhythm.    no edema    RESPIRATORY:   Decreased basilar air entry bilaterally  Decreased expiratory wheeze bilaterally  Mild rhonchi bilaterally  Normal chest expansion  Not tachypneic,  No use of accessory muscles    GASTROINTESTINAL:  Abdomen soft,   non-tender,   no guarding,   + BS    MUSCULOSKELETAL:   range of motion is not limited,  no clubbing, cyanosis    NEUROLOGICAL:   Alert and oriented   no motor or deficits.    SKIN:   Skin normal color for race,   warm, dry   No evidence of rash.    No labs    MEDICATIONS  (STANDING):  albuterol/ipratropium for Nebulization 3 milliLiter(s) Nebulizer every 6 hours  amLODIPine   Tablet 5 milliGRAM(s) Oral daily  budesonide 160 MICROgram(s)/formoterol 4.5 MICROgram(s) Inhaler 2 Puff(s) Inhalation two times a day  chlorhexidine 4% Liquid 1 Application(s) Topical <User Schedule>  enoxaparin Injectable 40 milliGRAM(s) SubCutaneous at bedtime  pantoprazole    Tablet 40 milliGRAM(s) Oral before breakfast  predniSONE   Tablet 10 milliGRAM(s) Oral two times a day  simvastatin 20 milliGRAM(s) Oral at bedtime    MEDICATIONS  (PRN):  acetaminophen     Tablet .. 650 milliGRAM(s) Oral every 6 hours PRN Temp greater or equal to 38C (100.4F), Mild Pain (1 - 3)  guaifenesin/dextromethorphan Oral Liquid 10 milliLiter(s) Oral every 6 hours PRN Cough  melatonin 3 milliGRAM(s) Oral at bedtime PRN Insomnia  ondansetron Injectable 4 milliGRAM(s) IV Push every 8 hours PRN Nausea and/or Vomiting
S: Pt is feeling better    Vital Signs Last 24 Hrs  T(C): 37.3 (30 Nov 2021 13:30), Max: 37.3 (30 Nov 2021 13:30)  T(F): 99.1 (30 Nov 2021 13:30), Max: 99.1 (30 Nov 2021 13:30)  HR: 98 (30 Nov 2021 13:30) (90 - 132)  BP: 132/60 (30 Nov 2021 13:30) (121/69 - 165/85)  RR: 20 (30 Nov 2021 13:30) (20 - 26)  SpO2: 92% (29 Nov 2021 22:00) (86% - 95%)    PHYSICAL EXAM:  GENERAL: NAD, well-developed  HEAD:  Atraumatic, Normocephalic  EYES: EOMI, Conjunctiva and sclera clear  NECK: Supple, No JVD  CHEST/LUNG: Decreased Breath Sounds Bilateral; No wheeze No rhochi  HEART: Regular rate and rhythm, No murmurs, rubs, or gallops  ABDOMEN: Soft, Nontender, Nondistended; Bowel sounds present  EXTREMITIES:  2+ Peripheral Pulses, No clubbing, cyanosis, or edema  PSYCH: AAOx3  SKIN: No rashes or lesions                           11.1   7.09  )-----------( 347      ( 30 Nov 2021 07:08 )             34.6     11-30    131<L>  |  95<L>  |  11  ----------------------------<  150<H>  4.1   |  20  |  0.6<L>    Ca    8.8      30 Nov 2021 07:08    TPro  6.6  /  Alb  4.3  /  TBili  0.5  /  DBili  x   /  AST  22  /  ALT  11  /  AlkPhos  95  11-30

## 2021-12-03 NOTE — DISCHARGE NOTE NURSING/CASE MANAGEMENT/SOCIAL WORK - NSDCPEFALRISK_GEN_ALL_CORE
For information on Fall & Injury Prevention, visit: [email or web address] OR  [email or web address] OR  https://www.cdc.gov/steadi/patient.html

## 2021-12-03 NOTE — DISCHARGE NOTE NURSING/CASE MANAGEMENT/SOCIAL WORK - PATIENT PORTAL LINK FT
You can access the FollowMyHealth Patient Portal offered by [facility] by registering at the following website: [email or web address]. By joining [employer]’s FollowMyHealth portal, you will also be able to view your health information using other applications (apps) compatible with our system.

## 2021-12-03 NOTE — PROGRESS NOTE ADULT - ATTENDING COMMENTS
Plan discussed with ACP and edits made within plan itself. Agree with assessment.
patient seen and examined agree above note   check pox on Ra rest and exertion   prednisone 60 mg daily for 3 days then 40 mg for 3 days then 20 mg for 3 days   symbicort 160 mg Q 12 hrs   proair as needed or albuterol nebulizer   f/u in 3-4 weeks
patient seen and examined agree above note   check pox on Ra rest and exertion   prednisone 60 mg daily for 3 days then 40 mg for 3 days then 20 mg for 3 days   symbicort 160 mg Q 12 hrs   proair as needed or albuterol nebulizer   f/u in 3-4 weeks

## 2021-12-03 NOTE — PROGRESS NOTE ADULT - ASSESSMENT
Impression:  COPD exacerbation, resolved  RSV infection      Plan:  Prednisone taper 60 mg daily for 3 days then 40 mg for 3 days then 20 mg for 3 days  Symbicort 160-4.5 q12h (doesn't like Wyxela)  Target POx 88 - 92%  ABG/VBG for baseline PCO2  Can discharge on supplemental O2 prn at rest or ambulation  Proair q6h as needed prn  FU in 3 - 4 weeks

## 2021-12-08 ENCOUNTER — APPOINTMENT (OUTPATIENT)
Dept: CARE COORDINATION | Facility: HOME HEALTH | Age: 86
End: 2021-12-08
Payer: MEDICARE

## 2021-12-08 VITALS
DIASTOLIC BLOOD PRESSURE: 70 MMHG | RESPIRATION RATE: 16 BRPM | SYSTOLIC BLOOD PRESSURE: 120 MMHG | HEART RATE: 75 BPM | OXYGEN SATURATION: 96 %

## 2021-12-08 DIAGNOSIS — R00.0 TACHYCARDIA, UNSPECIFIED: ICD-10-CM

## 2021-12-08 DIAGNOSIS — E78.5 HYPERLIPIDEMIA, UNSPECIFIED: ICD-10-CM

## 2021-12-08 DIAGNOSIS — I10 ESSENTIAL (PRIMARY) HYPERTENSION: ICD-10-CM

## 2021-12-08 DIAGNOSIS — J44.0 CHRONIC OBSTRUCTIVE PULMONARY DISEASE WITH (ACUTE) LOWER RESPIRATORY INFECTION: ICD-10-CM

## 2021-12-08 DIAGNOSIS — J20.5 ACUTE BRONCHITIS DUE TO RESPIRATORY SYNCYTIAL VIRUS: ICD-10-CM

## 2021-12-08 DIAGNOSIS — Z92.22 PERSONAL HISTORY OF MONOCLONAL DRUG THERAPY: ICD-10-CM

## 2021-12-08 DIAGNOSIS — J96.01 ACUTE RESPIRATORY FAILURE WITH HYPOXIA: ICD-10-CM

## 2021-12-08 DIAGNOSIS — Z98.1 ARTHRODESIS STATUS: ICD-10-CM

## 2021-12-08 DIAGNOSIS — R06.02 SHORTNESS OF BREATH: ICD-10-CM

## 2021-12-08 DIAGNOSIS — J44.1 CHRONIC OBSTRUCTIVE PULMONARY DISEASE WITH (ACUTE) EXACERBATION: ICD-10-CM

## 2021-12-08 DIAGNOSIS — Z87.891 PERSONAL HISTORY OF NICOTINE DEPENDENCE: ICD-10-CM

## 2021-12-08 DIAGNOSIS — R73.9 HYPERGLYCEMIA, UNSPECIFIED: ICD-10-CM

## 2021-12-08 PROCEDURE — 99496 TRANSJ CARE MGMT HIGH F2F 7D: CPT

## 2021-12-08 RX ORDER — METOPROLOL SUCCINATE 25 MG/1
25 TABLET, EXTENDED RELEASE ORAL
Refills: 0 | Status: DISCONTINUED | COMMUNITY
End: 2021-12-08

## 2021-12-08 NOTE — COUNSELING
[de-identified] : Pt was informed about CN’s role/ STARS program and overview of transitional care reviewed with patient. Pt educated on topics of importance such as compliance with prescribed medication regimen, COPD action plan and escalation process, adequate hydration, and proper diet. Pt encouraged calling CN with any issues, concerns or questions, also educated to notify CN if experiencing CP, SOB, cough, increased mucus production, increased use of rescue inhaler, fever, chills, fatigue, “chest cold symptoms” dizziness, lightheadedness, n/v/d/c, swelling to extremities and/or any signs of COPD exacerbation/flare as reviewed. Reassurance provided. Will continue to monitor\par \par

## 2021-12-08 NOTE — PHYSICAL EXAM
[No Acute Distress] : no acute distress [Well-Appearing] : well-appearing [No Respiratory Distress] : no respiratory distress  [No Accessory Muscle Use] : no accessory muscle use [Normal Rhythm/Effort] : normal respiratory rhythm and effort [Clear Bilaterally] : the lungs were clear to auscultation bilaterally [Rales / Crackles Bilateral] : no rales or crackles were heard [Wheezing Bilaterally] : no wheezing was heard [Normal Rate] : normal rate  [Regular Rhythm] : with a regular rhythm [Normal S1, S2] : normal S1 and S2 [No Murmur] : no murmur heard [No Edema] : there was no peripheral edema [Non Tender] : non-tender [Non-distended] : non-distended [No Focal Deficits] : no focal deficits [Normal Affect] : the affect was normal [Alert and Oriented x3] : oriented to person, place, and time [Normal Insight/Judgement] : insight and judgment were intact

## 2021-12-08 NOTE — REVIEW OF SYSTEMS
[Shortness Of Breath] : no shortness of breath [Wheezing] : no wheezing [Cough] : cough [Dyspnea on Exertion] : dyspnea on exertion [Negative] : Neurological [FreeTextEntry6] : mild raymond , cough improved

## 2021-12-08 NOTE — PLAN
[FreeTextEntry1] : COPD- c/w Symbicort, nebulizers prn, prednisone, mucinex and pulmonary follow up as scheduled, yellow zones reviewed \par HTN- low salt diet c/w amliodipine\par HLD- c/w statin heart healthy diet , continued PCP follow up

## 2021-12-08 NOTE — ASSESSMENT
[FreeTextEntry1] : Patient is 87 y/o female enrolled in the STARS program s/p a recent discharge from [facility] for COPD exacerbation secondary to RSV. She has a PMH of COPD , HTN and HLD, she was found stable for d/c to home w/o services. On arrival patient is alert in NAD denies c/p, fever, SOB at rest c/o cough improved and mild ANGEL.

## 2021-12-08 NOTE — HISTORY OF PRESENT ILLNESS
[FreeTextEntry1] : follow up  hospital discharge COPD  [de-identified] : Patient is 85 y/o female enrolled in the STARS program s/p a recent discharge from [facility] 11/30-12/3  for COPD exacerbation secondary to RSV/ bronchitis  She has a PMH of COPD , HTN and HLD, she was found stable for d/c to home w/o services s/p receiving duonebs and steroids. On arrival patient is alert in NAD denies c/p, fever, SOB at rest c/o cough improved and mild ANGEL. Patient was contacted by RN from TCM and medication reconciliation was done with in 48 hours of discharge\par \par

## 2022-01-04 ENCOUNTER — APPOINTMENT (OUTPATIENT)
Dept: HEMATOLOGY ONCOLOGY | Facility: CLINIC | Age: 87
End: 2022-01-04

## 2022-02-17 ENCOUNTER — APPOINTMENT (OUTPATIENT)
Age: 87
End: 2022-02-17
Payer: MEDICARE

## 2022-02-17 VITALS
HEART RATE: 84 BPM | OXYGEN SATURATION: 96 % | SYSTOLIC BLOOD PRESSURE: 110 MMHG | DIASTOLIC BLOOD PRESSURE: 60 MMHG | BODY MASS INDEX: 17.93 KG/M2 | WEIGHT: 105 LBS | HEIGHT: 64 IN

## 2022-02-17 PROCEDURE — 99214 OFFICE O/P EST MOD 30 MIN: CPT

## 2022-02-17 NOTE — REVIEW OF SYSTEMS
[Cough] : no cough [Wheezing] : no wheezing [SOB on Exertion] : sob on exertion [Negative] : Endocrine

## 2022-06-01 ENCOUNTER — RX RENEWAL (OUTPATIENT)
Age: 87
End: 2022-06-01

## 2022-06-03 ENCOUNTER — TRANSCRIPTION ENCOUNTER (OUTPATIENT)
Age: 87
End: 2022-06-03

## 2022-06-03 ENCOUNTER — OUTPATIENT (OUTPATIENT)
Dept: OUTPATIENT SERVICES | Facility: HOSPITAL | Age: 87
LOS: 1 days | Discharge: HOME | End: 2022-06-03

## 2022-06-03 VITALS
WEIGHT: 102.96 LBS | HEIGHT: 64 IN | HEART RATE: 70 BPM | TEMPERATURE: 98 F | DIASTOLIC BLOOD PRESSURE: 58 MMHG | OXYGEN SATURATION: 98 % | SYSTOLIC BLOOD PRESSURE: 116 MMHG | RESPIRATION RATE: 18 BRPM

## 2022-06-03 VITALS — RESPIRATION RATE: 17 BRPM | DIASTOLIC BLOOD PRESSURE: 58 MMHG | HEART RATE: 77 BPM | SYSTOLIC BLOOD PRESSURE: 130 MMHG

## 2022-06-03 DIAGNOSIS — Z98.1 ARTHRODESIS STATUS: Chronic | ICD-10-CM

## 2022-06-03 DIAGNOSIS — Z98.49 CATARACT EXTRACTION STATUS, UNSPECIFIED EYE: Chronic | ICD-10-CM

## 2022-06-03 RX ORDER — IBUPROFEN 200 MG
1 TABLET ORAL
Qty: 20 | Refills: 0
Start: 2022-06-03

## 2022-06-03 NOTE — ASU DISCHARGE PLAN (ADULT/PEDIATRIC) - NS MD DC FALL RISK RISK
For information on Fall & Injury Prevention, visit: [email or web address] OR  [email or web address] OR  https://www.cdc.gov/steadi/patient.html

## 2022-06-03 NOTE — ASU PATIENT PROFILE, ADULT - FALL HARM RISK - HARM RISK INTERVENTIONS

## 2022-06-03 NOTE — ASU PATIENT PROFILE, ADULT - NSICDXPASTMEDICALHX_GEN_ALL_CORE_FT
PAST MEDICAL HISTORY:  COPD, mild     H/O degenerative disc disease     HTN (hypertension)     Hypercholesterolemia

## 2022-06-06 DIAGNOSIS — G56.01 CARPAL TUNNEL SYNDROME, RIGHT UPPER LIMB: ICD-10-CM

## 2022-06-22 ENCOUNTER — APPOINTMENT (OUTPATIENT)
Age: 87
End: 2022-06-22
Payer: MEDICARE

## 2022-06-22 VITALS
HEART RATE: 78 BPM | DIASTOLIC BLOOD PRESSURE: 80 MMHG | RESPIRATION RATE: 16 BRPM | WEIGHT: 103 LBS | SYSTOLIC BLOOD PRESSURE: 122 MMHG | HEIGHT: 64 IN | BODY MASS INDEX: 17.58 KG/M2 | OXYGEN SATURATION: 98 %

## 2022-06-22 PROBLEM — Z87.39 PERSONAL HISTORY OF OTHER DISEASES OF THE MUSCULOSKELETAL SYSTEM AND CONNECTIVE TISSUE: Chronic | Status: ACTIVE | Noted: 2022-06-03

## 2022-06-22 PROCEDURE — 99214 OFFICE O/P EST MOD 30 MIN: CPT | Mod: 25

## 2022-06-22 PROCEDURE — 94729 DIFFUSING CAPACITY: CPT

## 2022-06-22 PROCEDURE — 94727 GAS DIL/WSHOT DETER LNG VOL: CPT

## 2022-06-22 PROCEDURE — 94010 BREATHING CAPACITY TEST: CPT

## 2022-08-09 ENCOUNTER — APPOINTMENT (OUTPATIENT)
Dept: ORTHOPEDIC SURGERY | Facility: CLINIC | Age: 87
End: 2022-08-09

## 2022-08-09 PROCEDURE — 20611 DRAIN/INJ JOINT/BURSA W/US: CPT | Mod: RT

## 2022-08-09 PROCEDURE — 99213 OFFICE O/P EST LOW 20 MIN: CPT | Mod: 25

## 2022-08-09 NOTE — PROCEDURE
[Large Joint Injection] : Large joint injection [Right] : of the right [Knee] : knee [Synvisc] : Synvisc [#1] : series #1 [Risks, benefits, alternatives discussed / Verbal consent obtained] : the risks benefits, and alternatives have been discussed, and verbal consent was obtained [All ultrasound images have been permanently captured and stored accordingly in our picture archiving and communication system] : All ultrasound images have been permanently captured and stored accordingly in our picture archiving and communication system [Visualization of the needle and placement of injection was performed without complication] : visualization of the needle and placement of injection was performed without complication

## 2022-08-09 NOTE — HISTORY OF PRESENT ILLNESS
[de-identified] : The patient is an 87-year-old female here for a re-evaluation of her right knee.  She has a history of osteoarthritis.  She is having pain in the right knee, she points medially as to where the pain is.

## 2022-08-09 NOTE — DISCUSSION/SUMMARY
[de-identified] :   Today I recommend a Synvisc injection for the right knee.  The patient agreed.  The right knee was injected with 2 cc of Synvisc.  Procedure note generated.  I will see her back in a week for further evaluation.  This was the 1st injection of Synvisc for her right knee.\par \par Supervising physician:  Dr. Carr

## 2022-08-09 NOTE — PHYSICAL EXAM
[Right] : right knee [NL (0)] : extension 0 degrees [] : patient ambulates with assistive device [TWNoteComboBox7] : flexion 90 degrees

## 2022-08-11 ENCOUNTER — APPOINTMENT (OUTPATIENT)
Dept: ORTHOPEDIC SURGERY | Facility: CLINIC | Age: 87
End: 2022-08-11

## 2022-08-11 PROCEDURE — 20610 DRAIN/INJ JOINT/BURSA W/O US: CPT | Mod: 59,RT

## 2022-08-11 PROCEDURE — 99213 OFFICE O/P EST LOW 20 MIN: CPT | Mod: 25

## 2022-08-11 PROCEDURE — 20611 DRAIN/INJ JOINT/BURSA W/US: CPT | Mod: RT

## 2022-08-11 NOTE — PROCEDURE
[Large Joint Injection] : Large joint injection [Right] : of the right [Shoulder] : shoulder [___ cc    1%] : Lidocaine ~Vcc of 1%  [___ cc    4mg] : Dexamethasone (Decadron) ~Vcc of 4 mg  [Risks, benefits, alternatives discussed / Verbal consent obtained] : the risks benefits, and alternatives have been discussed, and verbal consent was obtained [Glenohmeral injection] : glenohumeral injection [All ultrasound images have been permanently captured and stored accordingly in our picture archiving and communication system] : All ultrasound images have been permanently captured and stored accordingly in our picture archiving and communication system [FreeTextEntry3] :   Right hip trochanteric bursa injection performed.  The area was cleansed and prepped in the usual sterile fashion and 2 cc lidocaine, 1 cc dexamethasone was injected into the right trochanteric bursa, she tolerated procedure well.  The puncture was covered Band-Aid.

## 2022-08-11 NOTE — PHYSICAL EXAM
[TWNoteComboBox7] : active forward flexion 165 degrees [TWNoteComboBox4] : passive forward flexion 170 degrees [de-identified] : active abduction 90 degrees [TWNoteComboBox6] : internal rotation lateral hip [Right] : right hip [] : patient ambulates without assistive device [FreeTextEntry9] :   Full range of motion with right hip flexion and extension.  Good range of motion with external and internal rotation of the right hip.

## 2022-08-11 NOTE — HISTORY OF PRESENT ILLNESS
[de-identified] : The patient is an 87-year-old female here for subsequent re-evaluation of her right hip and her right shoulder.  She gets pain in the right shoulder on a daily basis.  The severity waxes and wanes.  She had a cortisone injection here for right shoulder approximately 3 months ago and did well from that.  She also has right hip trochanteric bursitis and had an injection here 3 months ago and did very well with that.  She is still getting some pain in the hip.  The severity waxes and wanes.  She points over the trochanteric bursa as to where the pain is.

## 2022-08-16 ENCOUNTER — APPOINTMENT (OUTPATIENT)
Dept: ORTHOPEDIC SURGERY | Facility: CLINIC | Age: 87
End: 2022-08-16

## 2022-08-16 PROCEDURE — 20611 DRAIN/INJ JOINT/BURSA W/US: CPT | Mod: RT

## 2022-08-16 NOTE — HISTORY OF PRESENT ILLNESS
[de-identified] : The patient is an 87-year-old female here for a subsequent re-evaluation of her right knee.  She has a history of osteoarthritis.  She is having pain in the right knee, she points medially as to where the pain is.  She had an injection of Synvisc for her right knee 1 week ago.

## 2022-08-16 NOTE — DISCUSSION/SUMMARY
[de-identified] :   Today I recommend a Synvisc injection for the right knee.  The patient agreed.  The right knee was injected with 2 cc of Synvisc.  Procedure note generated.  I will see her back in a week for further evaluation.  This was the 2nd injection of Synvisc for her right knee.\par \par Supervising physician:

## 2022-08-16 NOTE — PROCEDURE
[Large Joint Injection] : Large joint injection [Right] : of the right [Knee] : knee [Synvisc] : Synvisc [#2] : series #2 [Risks, benefits, alternatives discussed / Verbal consent obtained] : the risks benefits, and alternatives have been discussed, and verbal consent was obtained [All ultrasound images have been permanently captured and stored accordingly in our picture archiving and communication system] : All ultrasound images have been permanently captured and stored accordingly in our picture archiving and communication system [Visualization of the needle and placement of injection was performed without complication] : visualization of the needle and placement of injection was performed without complication

## 2022-08-23 ENCOUNTER — APPOINTMENT (OUTPATIENT)
Dept: ORTHOPEDIC SURGERY | Facility: CLINIC | Age: 87
End: 2022-08-23

## 2022-08-23 PROCEDURE — 20611 DRAIN/INJ JOINT/BURSA W/US: CPT | Mod: RT

## 2022-08-23 NOTE — PROCEDURE
[Large Joint Injection] : Large joint injection [Right] : of the right [Knee] : knee [Synvisc] : Synvisc [#3] : series #3 [Risks, benefits, alternatives discussed / Verbal consent obtained] : the risks benefits, and alternatives have been discussed, and verbal consent was obtained [All ultrasound images have been permanently captured and stored accordingly in our picture archiving and communication system] : All ultrasound images have been permanently captured and stored accordingly in our picture archiving and communication system [Visualization of the needle and placement of injection was performed without complication] : visualization of the needle and placement of injection was performed without complication

## 2022-08-23 NOTE — DISCUSSION/SUMMARY
[de-identified] :   Today I recommend a Synvisc injection for the right knee.  The patient agreed.  The right knee was injected with 2 cc of Synvisc.  Procedure note generated.  I will see her back in six months for further evaluation.  This was the 3rd injection of Synvisc for her right knee.\par \par Supervising physician: Dr. Carr

## 2022-08-23 NOTE — HISTORY OF PRESENT ILLNESS
[de-identified] : The patient is an 87-year-old female here for a subsequent re-evaluation of her right knee.  She has a history of osteoarthritis.  She is having pain in the right knee, she points medially as to where the pain is.  She had her second injection of Synvisc for her right knee 1 week ago.

## 2022-11-14 ENCOUNTER — APPOINTMENT (OUTPATIENT)
Dept: ORTHOPEDIC SURGERY | Facility: CLINIC | Age: 87
End: 2022-11-14

## 2022-11-14 PROCEDURE — 99213 OFFICE O/P EST LOW 20 MIN: CPT | Mod: 25

## 2022-11-14 PROCEDURE — 20611 DRAIN/INJ JOINT/BURSA W/US: CPT | Mod: 59,RT

## 2022-11-14 NOTE — DISCUSSION/SUMMARY
[de-identified] : Today I recommend a repeat cortisone injection for the right shoulder and right hip trochanteric bursitis.  The patient agreed.  The right shoulder was injected with 2 cc lidocaine, 1 cc dexamethasone, procedure note generated.  The right hip intertrochanteric bursa was injected with 2 cc lidocaine, 1 cc dexamethasone.  Procedure note generated.  I will see her back in 4 months for further evaluation of the right shoulder and right hip.\par \par Supervising physician:

## 2022-11-14 NOTE — PHYSICAL EXAM
[Right] : right hip [TWNoteComboBox7] : active forward flexion 165 degrees [TWNoteComboBox4] : passive forward flexion 170 degrees [de-identified] : active abduction 100 degrees [TWNoteComboBox6] : internal rotation L3 [] : non-antalgic [FreeTextEntry9] :   Full range of motion with right hip flexion and extension.  Good range of motion with external and internal rotation of the right hip.

## 2022-11-14 NOTE — HISTORY OF PRESENT ILLNESS
[de-identified] : The patient is an 87-year-old female here for a subsequent re-evaluation of her right hip and her right shoulder.  She gets pain in the right shoulder on a daily basis.  The severity waxes and wanes.  She also has right hip trochanteric bursitis. She is still getting some pain in the hip.  The severity waxes and wanes.  She points over the trochanteric bursa as to where the pain is.

## 2022-11-14 NOTE — PROCEDURE
[Glenohumeral Joint] : glenohumeral joint [Visualization of the needle and placement of injection was performed without complication] : visualization of the needle and placement of injection was performed without complication [Large Joint Injection] : Large joint injection [Right] : of the right [Greater Trochanteric Bursa] : greater trochanteric bursa [___ cc    1%] : Lidocaine ~Vcc of 1%  [___ cc    4mg] : Dexamethasone (Decadron) ~Vcc of 4 mg  [Risks, benefits, alternatives discussed / Verbal consent obtained] : the risks benefits, and alternatives have been discussed, and verbal consent was obtained [All ultrasound images have been permanently captured and stored accordingly in our picture archiving and communication system] : All ultrasound images have been permanently captured and stored accordingly in our picture archiving and communication system

## 2022-12-14 ENCOUNTER — APPOINTMENT (OUTPATIENT)
Dept: ORTHOPEDIC SURGERY | Facility: CLINIC | Age: 87
End: 2022-12-14

## 2022-12-14 ENCOUNTER — RESULT CHARGE (OUTPATIENT)
Age: 87
End: 2022-12-14

## 2022-12-14 VITALS — WEIGHT: 103 LBS | HEIGHT: 64 IN | BODY MASS INDEX: 17.58 KG/M2

## 2022-12-14 DIAGNOSIS — M18.0 BILATERAL PRIMARY OSTEOARTHRITIS OF FIRST CARPOMETACARPAL JOINTS: ICD-10-CM

## 2022-12-14 PROCEDURE — 99213 OFFICE O/P EST LOW 20 MIN: CPT | Mod: 25

## 2022-12-14 PROCEDURE — 20600 DRAIN/INJ JOINT/BURSA W/O US: CPT

## 2022-12-14 PROCEDURE — 73140 X-RAY EXAM OF FINGER(S): CPT | Mod: 50

## 2022-12-14 NOTE — DISCUSSION/SUMMARY
[de-identified] :   Today we discussed treatment options including anti-inflammatories, physical therapy, bracing, and cortisone injections.  She is interested in cortisone injections.\par Today under sterile technique with the patient's consent I injected 0.5 cc dexamethasone and 0.5 cc of 1% lidocaine into the CMC joints of bilateral thumbs.\par She tolerated this well.  The puncture sites were covered with Band-Aids.\par She will follow up in 6 months if she has continued pain and would like another injection.\par This appointment will be with Dr. Holliday.\par All her questions were answered today.

## 2022-12-14 NOTE — HISTORY OF PRESENT ILLNESS
[de-identified] :  Patient is an 87-year-old female here for repeat evaluation of her bilateral thumbs.  She had excellent results from the carpal tunnel releases.  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  It has been acting up on her recently.

## 2022-12-14 NOTE — DATA REVIEWED
[FreeTextEntry1] :   X-rays repeated in the office today of her bilateral thumbs show bilateral CMC osteoarthritis.

## 2022-12-14 NOTE — DISCUSSION/SUMMARY
[de-identified] :   Today we discussed treatment options including anti-inflammatories, physical therapy, bracing, and cortisone injections.  She is interested in cortisone injections.\par Today under sterile technique with the patient's consent I injected 0.5 cc dexamethasone and 0.5 cc of 1% lidocaine into the CMC joints of bilateral thumbs.\par She tolerated this well.  The puncture sites were covered with Band-Aids.\par She will follow up in 6 months if she has continued pain and would like another injection.\par This appointment will be with Dr. Holliday.\par All her questions were answered today.

## 2022-12-14 NOTE — HISTORY OF PRESENT ILLNESS
[de-identified] :  Patient is an 87-year-old female here for repeat evaluation of her bilateral thumbs.  She had excellent results from the carpal tunnel releases.  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  It has been acting up on her recently.

## 2022-12-14 NOTE — PHYSICAL EXAM
[de-identified] :   Physical exam of her bilateral thumbs:  Negative swelling or ecchymosis.  There is obvious deformities of the thumbs.  Tenderness over the CMC joints.  Positive CMC grind bilaterally.  Sensory motor are intact.  She can make a full fist.  She has well-healed surgical scars from her carpal tunnel releases.

## 2023-01-03 ENCOUNTER — APPOINTMENT (OUTPATIENT)
Age: 88
End: 2023-01-03
Payer: MEDICARE

## 2023-01-03 VITALS
HEART RATE: 82 BPM | OXYGEN SATURATION: 95 % | RESPIRATION RATE: 14 BRPM | HEIGHT: 64 IN | DIASTOLIC BLOOD PRESSURE: 74 MMHG | SYSTOLIC BLOOD PRESSURE: 146 MMHG | WEIGHT: 101 LBS | BODY MASS INDEX: 17.24 KG/M2

## 2023-01-03 PROCEDURE — 99214 OFFICE O/P EST MOD 30 MIN: CPT

## 2023-01-03 RX ORDER — BUDESONIDE AND FORMOTEROL FUMARATE DIHYDRATE 80; 4.5 UG/1; UG/1
80-4.5 AEROSOL RESPIRATORY (INHALATION)
Qty: 1 | Refills: 2 | Status: COMPLETED | COMMUNITY
Start: 2022-02-17 | End: 2023-01-03

## 2023-01-03 RX ORDER — BUDESONIDE AND FORMOTEROL FUMARATE DIHYDRATE 80; 4.5 UG/1; UG/1
80-4.5 AEROSOL RESPIRATORY (INHALATION)
Qty: 1 | Refills: 2 | Status: COMPLETED | COMMUNITY
Start: 2022-06-01 | End: 2023-01-03

## 2023-01-04 ENCOUNTER — OUTPATIENT (OUTPATIENT)
Dept: OUTPATIENT SERVICES | Facility: HOSPITAL | Age: 88
LOS: 1 days | Discharge: HOME | End: 2023-01-04
Payer: MEDICARE

## 2023-01-04 DIAGNOSIS — Z12.31 ENCOUNTER FOR SCREENING MAMMOGRAM FOR MALIGNANT NEOPLASM OF BREAST: ICD-10-CM

## 2023-01-04 DIAGNOSIS — Z98.49 CATARACT EXTRACTION STATUS, UNSPECIFIED EYE: Chronic | ICD-10-CM

## 2023-01-04 DIAGNOSIS — Z98.1 ARTHRODESIS STATUS: Chronic | ICD-10-CM

## 2023-01-04 PROCEDURE — 77067 SCR MAMMO BI INCL CAD: CPT | Mod: 26

## 2023-01-04 PROCEDURE — 77063 BREAST TOMOSYNTHESIS BI: CPT | Mod: 26

## 2023-01-13 ENCOUNTER — INPATIENT (INPATIENT)
Facility: HOSPITAL | Age: 88
LOS: 3 days | Discharge: NURSING, REHAB OR LONG-TERM CARE | End: 2023-01-17
Attending: INTERNAL MEDICINE | Admitting: INTERNAL MEDICINE
Payer: MEDICARE

## 2023-01-13 VITALS
RESPIRATION RATE: 20 BRPM | HEART RATE: 80 BPM | TEMPERATURE: 96 F | SYSTOLIC BLOOD PRESSURE: 156 MMHG | WEIGHT: 100.09 LBS | DIASTOLIC BLOOD PRESSURE: 70 MMHG | OXYGEN SATURATION: 99 %

## 2023-01-13 DIAGNOSIS — Z98.1 ARTHRODESIS STATUS: Chronic | ICD-10-CM

## 2023-01-13 DIAGNOSIS — Z98.49 CATARACT EXTRACTION STATUS, UNSPECIFIED EYE: Chronic | ICD-10-CM

## 2023-01-13 LAB
ALBUMIN SERPL ELPH-MCNC: 4.3 G/DL — SIGNIFICANT CHANGE UP (ref 3.5–5.2)
ALP SERPL-CCNC: 77 U/L — SIGNIFICANT CHANGE UP (ref 30–115)
ALT FLD-CCNC: 13 U/L — SIGNIFICANT CHANGE UP (ref 0–41)
ANION GAP SERPL CALC-SCNC: 12 MMOL/L — SIGNIFICANT CHANGE UP (ref 7–14)
AST SERPL-CCNC: 23 U/L — SIGNIFICANT CHANGE UP (ref 0–41)
BASOPHILS # BLD AUTO: 0.1 K/UL — SIGNIFICANT CHANGE UP (ref 0–0.2)
BASOPHILS NFR BLD AUTO: 1 % — SIGNIFICANT CHANGE UP (ref 0–1)
BILIRUB SERPL-MCNC: 0.8 MG/DL — SIGNIFICANT CHANGE UP (ref 0.2–1.2)
BUN SERPL-MCNC: 12 MG/DL — SIGNIFICANT CHANGE UP (ref 10–20)
CALCIUM SERPL-MCNC: 9.4 MG/DL — SIGNIFICANT CHANGE UP (ref 8.4–10.5)
CHLORIDE SERPL-SCNC: 93 MMOL/L — LOW (ref 98–110)
CO2 SERPL-SCNC: 24 MMOL/L — SIGNIFICANT CHANGE UP (ref 17–32)
CREAT SERPL-MCNC: 0.5 MG/DL — LOW (ref 0.7–1.5)
EGFR: 92 ML/MIN/1.73M2 — SIGNIFICANT CHANGE UP
EOSINOPHIL # BLD AUTO: 0.06 K/UL — SIGNIFICANT CHANGE UP (ref 0–0.7)
EOSINOPHIL NFR BLD AUTO: 0.6 % — SIGNIFICANT CHANGE UP (ref 0–8)
GLUCOSE SERPL-MCNC: 122 MG/DL — HIGH (ref 70–99)
HCT VFR BLD CALC: 44.2 % — SIGNIFICANT CHANGE UP (ref 37–47)
HGB BLD-MCNC: 15.2 G/DL — SIGNIFICANT CHANGE UP (ref 12–16)
IMM GRANULOCYTES NFR BLD AUTO: 0.2 % — SIGNIFICANT CHANGE UP (ref 0.1–0.3)
LYMPHOCYTES # BLD AUTO: 1.62 K/UL — SIGNIFICANT CHANGE UP (ref 1.2–3.4)
LYMPHOCYTES # BLD AUTO: 15.5 % — LOW (ref 20.5–51.1)
MCHC RBC-ENTMCNC: 32.1 PG — HIGH (ref 27–31)
MCHC RBC-ENTMCNC: 34.4 G/DL — SIGNIFICANT CHANGE UP (ref 32–37)
MCV RBC AUTO: 93.2 FL — SIGNIFICANT CHANGE UP (ref 81–99)
MONOCYTES # BLD AUTO: 1.09 K/UL — HIGH (ref 0.1–0.6)
MONOCYTES NFR BLD AUTO: 10.4 % — HIGH (ref 1.7–9.3)
NEUTROPHILS # BLD AUTO: 7.59 K/UL — HIGH (ref 1.4–6.5)
NEUTROPHILS NFR BLD AUTO: 72.3 % — SIGNIFICANT CHANGE UP (ref 42.2–75.2)
NRBC # BLD: 0 /100 WBCS — SIGNIFICANT CHANGE UP (ref 0–0)
PLATELET # BLD AUTO: 283 K/UL — SIGNIFICANT CHANGE UP (ref 130–400)
POTASSIUM SERPL-MCNC: 4.6 MMOL/L — SIGNIFICANT CHANGE UP (ref 3.5–5)
POTASSIUM SERPL-SCNC: 4.6 MMOL/L — SIGNIFICANT CHANGE UP (ref 3.5–5)
PROT SERPL-MCNC: 6.2 G/DL — SIGNIFICANT CHANGE UP (ref 6–8)
RBC # BLD: 4.74 M/UL — SIGNIFICANT CHANGE UP (ref 4.2–5.4)
RBC # FLD: 13.3 % — SIGNIFICANT CHANGE UP (ref 11.5–14.5)
SARS-COV-2 RNA SPEC QL NAA+PROBE: SIGNIFICANT CHANGE UP
SODIUM SERPL-SCNC: 129 MMOL/L — LOW (ref 135–146)
WBC # BLD: 10.48 K/UL — SIGNIFICANT CHANGE UP (ref 4.8–10.8)
WBC # FLD AUTO: 10.48 K/UL — SIGNIFICANT CHANGE UP (ref 4.8–10.8)

## 2023-01-13 PROCEDURE — 99285 EMERGENCY DEPT VISIT HI MDM: CPT | Mod: FS

## 2023-01-13 PROCEDURE — 72170 X-RAY EXAM OF PELVIS: CPT | Mod: 26

## 2023-01-13 PROCEDURE — 73564 X-RAY EXAM KNEE 4 OR MORE: CPT | Mod: 26,RT

## 2023-01-13 RX ORDER — LANOLIN ALCOHOL/MO/W.PET/CERES
3 CREAM (GRAM) TOPICAL AT BEDTIME
Refills: 0 | Status: DISCONTINUED | OUTPATIENT
Start: 2023-01-13 | End: 2023-01-17

## 2023-01-13 RX ORDER — MORPHINE SULFATE 50 MG/1
2 CAPSULE, EXTENDED RELEASE ORAL EVERY 4 HOURS
Refills: 0 | Status: DISCONTINUED | OUTPATIENT
Start: 2023-01-13 | End: 2023-01-17

## 2023-01-13 RX ORDER — ONDANSETRON 8 MG/1
4 TABLET, FILM COATED ORAL EVERY 8 HOURS
Refills: 0 | Status: DISCONTINUED | OUTPATIENT
Start: 2023-01-13 | End: 2023-01-17

## 2023-01-13 RX ORDER — SIMVASTATIN 20 MG/1
20 TABLET, FILM COATED ORAL AT BEDTIME
Refills: 0 | Status: DISCONTINUED | OUTPATIENT
Start: 2023-01-13 | End: 2023-01-17

## 2023-01-13 RX ORDER — BUDESONIDE AND FORMOTEROL FUMARATE DIHYDRATE 160; 4.5 UG/1; UG/1
2 AEROSOL RESPIRATORY (INHALATION)
Refills: 0 | Status: DISCONTINUED | OUTPATIENT
Start: 2023-01-13 | End: 2023-01-17

## 2023-01-13 RX ORDER — ACETAMINOPHEN 500 MG
650 TABLET ORAL ONCE
Refills: 0 | Status: COMPLETED | OUTPATIENT
Start: 2023-01-13 | End: 2023-01-13

## 2023-01-13 RX ORDER — ENOXAPARIN SODIUM 100 MG/ML
40 INJECTION SUBCUTANEOUS EVERY 24 HOURS
Refills: 0 | Status: DISCONTINUED | OUTPATIENT
Start: 2023-01-14 | End: 2023-01-17

## 2023-01-13 RX ORDER — METOPROLOL TARTRATE 50 MG
25 TABLET ORAL DAILY
Refills: 0 | Status: DISCONTINUED | OUTPATIENT
Start: 2023-01-14 | End: 2023-01-17

## 2023-01-13 RX ORDER — AMLODIPINE BESYLATE 2.5 MG/1
1 TABLET ORAL
Qty: 0 | Refills: 0 | DISCHARGE

## 2023-01-13 RX ORDER — MORPHINE SULFATE 50 MG/1
4 CAPSULE, EXTENDED RELEASE ORAL ONCE
Refills: 0 | Status: DISCONTINUED | OUTPATIENT
Start: 2023-01-13 | End: 2023-01-13

## 2023-01-13 RX ORDER — AMLODIPINE BESYLATE 2.5 MG/1
10 TABLET ORAL DAILY
Refills: 0 | Status: DISCONTINUED | OUTPATIENT
Start: 2023-01-14 | End: 2023-01-17

## 2023-01-13 RX ORDER — ACETAMINOPHEN 500 MG
650 TABLET ORAL EVERY 6 HOURS
Refills: 0 | Status: DISCONTINUED | OUTPATIENT
Start: 2023-01-13 | End: 2023-01-17

## 2023-01-13 RX ORDER — TRAMADOL HYDROCHLORIDE 50 MG/1
25 TABLET ORAL EVERY 6 HOURS
Refills: 0 | Status: DISCONTINUED | OUTPATIENT
Start: 2023-01-13 | End: 2023-01-14

## 2023-01-13 RX ADMIN — MORPHINE SULFATE 2 MILLIGRAM(S): 50 CAPSULE, EXTENDED RELEASE ORAL at 21:30

## 2023-01-13 RX ADMIN — TRAMADOL HYDROCHLORIDE 25 MILLIGRAM(S): 50 TABLET ORAL at 20:16

## 2023-01-13 RX ADMIN — Medication 650 MILLIGRAM(S): at 14:29

## 2023-01-13 RX ADMIN — MORPHINE SULFATE 2 MILLIGRAM(S): 50 CAPSULE, EXTENDED RELEASE ORAL at 21:13

## 2023-01-13 RX ADMIN — MORPHINE SULFATE 4 MILLIGRAM(S): 50 CAPSULE, EXTENDED RELEASE ORAL at 23:13

## 2023-01-13 NOTE — CONSULT NOTE ADULT - ASSESSMENT
87 y o F with right patella fx s/p fall, severe OA    -no ortho interventions  -pain control, icing  -knee immobilizer  -no knee ROM  -PT, wbat in knee immobilize  -pt will need knee krysten brace

## 2023-01-13 NOTE — H&P ADULT - NSHPPHYSICALEXAM_GEN_ALL_CORE
VITALS:   T(C): 35.7 (01-13-23 @ 14:02), Max: 35.7 (01-13-23 @ 14:02)  HR: 80 (01-13-23 @ 14:02) (80 - 80)  BP: 156/70 (01-13-23 @ 14:02) (156/70 - 156/70)  RR: 20 (01-13-23 @ 14:02) (20 - 20)  SpO2: 99% (01-13-23 @ 14:02) (99% - 99%)    GENERAL: NAD, lying in bed comfortably  HEAD:  Atraumatic, Normocephalic  EYES: EOMI, PERRLA, conjunctiva and sclera clear  ENT: Moist mucous membranes  NECK: Supple, No JVD  CHEST/LUNG: Clear to auscultation bilaterally; No rales, rhonchi, wheezing, or rubs. Unlabored respirations  HEART: Regular rate and rhythm; No murmurs, rubs, or gallops  ABDOMEN: BSx4; Soft, nontender, nondistended  EXTREMITIES:  2+ Peripheral Pulses, brisk capillary refill. No clubbing, cyanosis, or edema  NERVOUS SYSTEM:  A&Ox3, no focal deficits   SKIN: No rashes or lesions VITALS:   T(C): 35.7 (01-13-23 @ 14:02), Max: 35.7 (01-13-23 @ 14:02)  HR: 80 (01-13-23 @ 14:02) (80 - 80)  BP: 156/70 (01-13-23 @ 14:02) (156/70 - 156/70)  RR: 20 (01-13-23 @ 14:02) (20 - 20)  SpO2: 99% (01-13-23 @ 14:02) (99% - 99%)    GENERAL: NAD, lying in bed comfortably  HEAD:  Atraumatic, Normocephalic  EYES: EOMI, PERRLA, conjunctiva and sclera clear  ENT: Moist mucous membranes  NECK: Supple, No JVD  CHEST/LUNG: Clear to auscultation bilaterally; No rales, rhonchi, wheezing, or rubs. Unlabored respirations  HEART: Regular rate and rhythm; No murmurs, rubs, or gallops  ABDOMEN: BSx4; Soft, nontender, nondistended  EXTREMITIES:  2+ Peripheral Pulses, brisk capillary refill. Large right knee effusion. + echymoses on L knee. Tender to palpation of the right knee. Normal strength bilaterally.   NERVOUS SYSTEM:  A&Ox3, no focal deficits   SKIN: No rashes or lesions

## 2023-01-13 NOTE — H&P ADULT - HISTORY OF PRESENT ILLNESS
88yo female with PMHx of COPD (not on home O2) and HTN who presents with acute onset of left knee pain after sustaining a fall.     In the ED, AFVSS. Labs with Na 129, otherwise largely unremarkable. XR Knee showing lucencies in the patella suggestive of a fracture as well as a large joint effusion. Given tylenol. Ortho consulted. Admitted to medicine for further management.  88yo female with PMHx of COPD (not on home O2) and HTN who presents with acute onset of right knee pain after sustaining a fall. Patient reports she was in the kitchen where she lost balance and fell on her right side. Reports feeling well prior. She did not hit her head or lose consciousness. Denies lightheadedness, dizziness, chest pain, SOB, palpitations, fever, chills, abdominal pain, N/V/D. Only complaint currently is bilateral R>L knee pain.      In the ED, AFVSS. Labs with Na 129, otherwise largely unremarkable. XR Knee showing lucencies in the patella suggestive of a fracture as well as a large joint effusion. Given tylenol. Ortho consulted. Admitted to medicine for further management.

## 2023-01-13 NOTE — H&P ADULT - NSHPLABSRESULTS_GEN_ALL_CORE
15.2   10.48 )-----------( 283      ( 13 Jan 2023 14:30 )             44.2   01-13    129<L>  |  93<L>  |  12  ----------------------------<  122<H>  4.6   |  24  |  0.5<L>    Ca    9.4      13 Jan 2023 14:30    TPro  6.2  /  Alb  4.3  /  TBili  0.8  /  DBili  x   /  AST  23  /  ALT  13  /  AlkPhos  77  01-13    --------------------------------------------------------------------------------------------------------------------------------------------    ACC: [id number] EXAM:  XR KNEE COMP 4+ VIEWS RT                          PROCEDURE DATE:  01/13/2023          INTERPRETATION:  Clinical History / Reason for exam: Trauma    Technique:  4 radiographs of the right knee are obtained.    Comparison:  No studies are available for comparison.    Findings/  impression:    Osteopenia. Lucencies through the patella, suggestive of fracture. Large   knee joint effusion. Recommend CT evaluation.    Severe degenerative changes of the knee. Vascular calcifications.

## 2023-01-13 NOTE — ED PROVIDER NOTE - ATTENDING SHARED VISIT SELECTORS
Medical Decision Making
Patient with one or more new problems requiring additional work-up/treatment.

## 2023-01-13 NOTE — H&P ADULT - ASSESSMENT
88yo female with PMHx of COPD (not on home O2) and HTN who presents with acute onset of Right knee pain after sustaining a fall. Found to have a R patellar fracture and joint effusion. Fall appears mechanical based on history with low concern for any concerning prodrome event.  Awaiting orthopedics reccs.     #Fall   #R knee patella fracture  #R knee joint effusion  - F/up Ortho reccs  - PT eval   - Tylenol and tramadol prn for pain    #Hyponatremia  - Upon review of labs, appears chronic   - Cont trending for now     #HTN  - Cont Amlodipine 10mg, Metop XL 25mg     #HLD   - Cont  Zocor 20mg    #COPD  - Not on home O2   - Symbicort while inpatient (on trelegy at home)

## 2023-01-13 NOTE — ED ADULT NURSE NOTE - NSIMPLEMENTINTERV_GEN_ALL_ED
Implemented All Fall Risk Interventions:  [place] to call system. Call bell, personal items and telephone within reach. Instruct patient to call for assistance. Room bathroom lighting operational. Non-slip footwear when patient is off stretcher. Physically safe environment: no spills, clutter or unnecessary equipment. Stretcher in lowest position, wheels locked, appropriate side rails in place. Provide visual cue, wrist band, yellow gown, etc. Monitor gait and stability. Monitor for mental status changes and reorient to person, place, and time. Review medications for side effects contributing to fall risk. Reinforce activity limits and safety measures with patient and family.

## 2023-01-13 NOTE — ED PROVIDER NOTE - OBJECTIVE STATEMENT
87-year-old female, past medical history of hypertension, asthma, presents emergency department for fall.  Occurred today fell onto her right knee, complaining of pain to right knee.Patient states unable to ambulate after fall secondary to pain in the knee

## 2023-01-13 NOTE — CONSULT NOTE ADULT - SUBJECTIVE AND OBJECTIVE BOX
88yo female with PMHx of COPD (not on home O2) and HTN who presents with acute onset of right knee pain after sustaining a fall. Patient reports she was in the kitchen where she lost balance and fell on her right side. Reports feeling well prior. She did not hit her head or lose consciousness. Denies lightheadedness, dizziness, chest pain, SOB, palpitations, fever, chills, abdominal pain, N/V/D. Only complaint currently is bilateral R>L knee pain.     PAST MEDICAL & SURGICAL HISTORY:  HTN (hypertension)      Hypercholesterolemia      COPD, mild      H/O degenerative disc disease      History of fusion of cervical spine      H/O cataract extraction  bl eyes    Home Medications:  metoprolol succinate 25 mg oral tablet, extended release: 1 tab(s) orally once a day (13 Jan 2023 17:20)  Norvasc 5 mg oral tablet: 2 tab(s) orally once a day (13 Jan 2023 17:28)  Trelegy Ellipta 100 mcg-62.5 mcg-25 mcg/inh inhalation powder: 1 puff(s) inhaled once a day (13 Jan 2023 17:21)  Vitamin B12 1000 mcg oral tablet: 1 tab(s) orally once a day (13 Jan 2023 17:20)  Vitamin D3 25 mcg (1000 intl units) oral tablet: 1 tab(s) orally once a day (13 Jan 2023 17:20)  Zocor 20 mg oral tablet: 1 tab(s) orally once a day (at bedtime) (03 Jun 2022 07:32)        MEDICATIONS  (STANDING):  budesonide 160 MICROgram(s)/formoterol 4.5 MICROgram(s) Inhaler 2 Puff(s) Inhalation two times a day  simvastatin 20 milliGRAM(s) Oral at bedtime    MEDICATIONS  (PRN):  acetaminophen     Tablet .. 650 milliGRAM(s) Oral every 6 hours PRN Temp greater or equal to 38C (100.4F), Mild Pain (1 - 3)  aluminum hydroxide/magnesium hydroxide/simethicone Suspension 30 milliLiter(s) Oral every 4 hours PRN Dyspepsia  melatonin 3 milliGRAM(s) Oral at bedtime PRN Insomnia  ondansetron Injectable 4 milliGRAM(s) IV Push every 8 hours PRN Nausea and/or Vomiting  traMADol 25 milliGRAM(s) Oral every 6 hours PRN Severe Pain (7 - 10)      Allergies    No Known Allergies    Intolerances    codeine (Nausea)    Right knee xray reviewed      ACC: [id number] EXAM:  XR KNEE COMP 4+ VIEWS RT                          PROCEDURE DATE:  01/13/2023          INTERPRETATION:  Clinical History / Reason for exam: Trauma    Technique:  4 radiographs of the right knee are obtained.    Comparison:  No studies are available for comparison.    Findings/  impression:    Osteopenia. Lucencies through the patella, suggestive of fracture. Large   knee joint effusion. Recommend CT evaluation.    Severe degenerative changes of the knee. Vascular calcifications.   88yo female with PMHx of COPD (not on home O2) and HTN who presents with acute onset of right knee pain after sustaining a fall. Patient reports she was in the kitchen where she lost balance and fell on her right side. Reports feeling well prior. She did not hit her head or lose consciousness. Denies lightheadedness, dizziness, chest pain, SOB, palpitations, fever, chills, abdominal pain, N/V/D. Only complaint currently is bilateral R>L knee pain. Pt was walking independently, without assistive devices prior to fall. F/u with dr Denis for knee OA, treated with knee injections.    PAST MEDICAL & SURGICAL HISTORY:  HTN (hypertension)      Hypercholesterolemia      COPD, mild      H/O degenerative disc disease      History of fusion of cervical spine      H/O cataract extraction  bl eyes    Home Medications:  metoprolol succinate 25 mg oral tablet, extended release: 1 tab(s) orally once a day (13 Jan 2023 17:20)  Norvasc 5 mg oral tablet: 2 tab(s) orally once a day (13 Jan 2023 17:28)  Trelegy Ellipta 100 mcg-62.5 mcg-25 mcg/inh inhalation powder: 1 puff(s) inhaled once a day (13 Jan 2023 17:21)  Vitamin B12 1000 mcg oral tablet: 1 tab(s) orally once a day (13 Jan 2023 17:20)  Vitamin D3 25 mcg (1000 intl units) oral tablet: 1 tab(s) orally once a day (13 Jan 2023 17:20)  Zocor 20 mg oral tablet: 1 tab(s) orally once a day (at bedtime) (03 Jun 2022 07:32)        MEDICATIONS  (STANDING):  budesonide 160 MICROgram(s)/formoterol 4.5 MICROgram(s) Inhaler 2 Puff(s) Inhalation two times a day  simvastatin 20 milliGRAM(s) Oral at bedtime    MEDICATIONS  (PRN):  acetaminophen     Tablet .. 650 milliGRAM(s) Oral every 6 hours PRN Temp greater or equal to 38C (100.4F), Mild Pain (1 - 3)  aluminum hydroxide/magnesium hydroxide/simethicone Suspension 30 milliLiter(s) Oral every 4 hours PRN Dyspepsia  melatonin 3 milliGRAM(s) Oral at bedtime PRN Insomnia  ondansetron Injectable 4 milliGRAM(s) IV Push every 8 hours PRN Nausea and/or Vomiting  traMADol 25 milliGRAM(s) Oral every 6 hours PRN Severe Pain (7 - 10)      Allergies    No Known Allergies    Intolerances    codeine (Nausea)    Pt s/e at bedside, c/o pain in her right knee, chronic right knee pain and swelling > L knee at baseline    NAD    Vital Signs Last 24 Hrs  T(C): 35.9 (13 Jan 2023 18:55), Max: 35.9 (13 Jan 2023 18:55)  T(F): 96.7 (13 Jan 2023 18:55), Max: 96.7 (13 Jan 2023 18:55)  HR: 95 (13 Jan 2023 18:55) (80 - 95)  BP: 153/72 (13 Jan 2023 18:55) (139/65 - 156/70)  BP(mean): --  RR: 20 (13 Jan 2023 18:55) (20 - 20)  SpO2: 95% (13 Jan 2023 18:55) (95% - 99%)    Parameters below as of 13 Jan 2023 14:02  Patient On (Oxygen Delivery Method): room air    PE:   right knee swelling, effusion, ttp, ecchymosis over patella, skin intact, compartments soft, NVID, foot wwp  knee immobilizer in place    Right knee xray reviewed      ACC: [id number] EXAM:  XR KNEE COMP 4+ VIEWS RT                          PROCEDURE DATE:  01/13/2023          INTERPRETATION:  Clinical History / Reason for exam: Trauma    Technique:  4 radiographs of the right knee are obtained.    Comparison:  No studies are available for comparison.    Findings/  impression:    Osteopenia. Lucencies through the patella, suggestive of fracture. Large   knee joint effusion. Recommend CT evaluation.    Severe degenerative changes of the knee. Vascular calcifications.

## 2023-01-13 NOTE — ED PROVIDER NOTE - CARE PLAN
1 Principal Discharge DX:	Closed fracture of patella  Secondary Diagnosis:	Closed fracture of tibia

## 2023-01-13 NOTE — ED PROVIDER NOTE - NS ED ATTENDING STATEMENT MOD
This was a shared visit with the OXANA. I reviewed and verified the documentation and independently performed the documented:

## 2023-01-13 NOTE — PATIENT PROFILE ADULT - FALL HARM RISK - RISK INTERVENTIONS

## 2023-01-13 NOTE — ED ADULT TRIAGE NOTE - MEANS OF ARRIVAL
stretcher Rhombic Flap Text: The defect edges were debeveled with a #15 scalpel blade.  Given the location of the defect and the proximity to free margins a rhombic flap was deemed most appropriate.  Using a sterile surgical marker, an appropriate rhombic flap was drawn incorporating the defect.    The area thus outlined was incised deep to adipose tissue with a #15 scalpel blade.  The skin margins were undermined to an appropriate distance in all directions utilizing iris scissors.

## 2023-01-14 LAB
ALBUMIN SERPL ELPH-MCNC: 3.8 G/DL — SIGNIFICANT CHANGE UP (ref 3.5–5.2)
ALP SERPL-CCNC: 74 U/L — SIGNIFICANT CHANGE UP (ref 30–115)
ALT FLD-CCNC: 12 U/L — SIGNIFICANT CHANGE UP (ref 0–41)
ANION GAP SERPL CALC-SCNC: 12 MMOL/L — SIGNIFICANT CHANGE UP (ref 7–14)
ANION GAP SERPL CALC-SCNC: 12 MMOL/L — SIGNIFICANT CHANGE UP (ref 7–14)
AST SERPL-CCNC: 27 U/L — SIGNIFICANT CHANGE UP (ref 0–41)
BILIRUB SERPL-MCNC: 1.3 MG/DL — HIGH (ref 0.2–1.2)
BUN SERPL-MCNC: 8 MG/DL — LOW (ref 10–20)
BUN SERPL-MCNC: 9 MG/DL — LOW (ref 10–20)
CALCIUM SERPL-MCNC: 8.9 MG/DL — SIGNIFICANT CHANGE UP (ref 8.4–10.5)
CALCIUM SERPL-MCNC: 9.3 MG/DL — SIGNIFICANT CHANGE UP (ref 8.4–10.5)
CHLORIDE SERPL-SCNC: 85 MMOL/L — LOW (ref 98–110)
CHLORIDE SERPL-SCNC: 88 MMOL/L — LOW (ref 98–110)
CO2 SERPL-SCNC: 23 MMOL/L — SIGNIFICANT CHANGE UP (ref 17–32)
CO2 SERPL-SCNC: 25 MMOL/L — SIGNIFICANT CHANGE UP (ref 17–32)
CREAT SERPL-MCNC: 0.6 MG/DL — LOW (ref 0.7–1.5)
CREAT SERPL-MCNC: 0.6 MG/DL — LOW (ref 0.7–1.5)
EGFR: 88 ML/MIN/1.73M2 — SIGNIFICANT CHANGE UP
EGFR: 88 ML/MIN/1.73M2 — SIGNIFICANT CHANGE UP
GLUCOSE SERPL-MCNC: 125 MG/DL — HIGH (ref 70–99)
GLUCOSE SERPL-MCNC: 96 MG/DL — SIGNIFICANT CHANGE UP (ref 70–99)
HCT VFR BLD CALC: 41 % — SIGNIFICANT CHANGE UP (ref 37–47)
HGB BLD-MCNC: 14.3 G/DL — SIGNIFICANT CHANGE UP (ref 12–16)
MCHC RBC-ENTMCNC: 32.1 PG — HIGH (ref 27–31)
MCHC RBC-ENTMCNC: 34.9 G/DL — SIGNIFICANT CHANGE UP (ref 32–37)
MCV RBC AUTO: 92.1 FL — SIGNIFICANT CHANGE UP (ref 81–99)
NRBC # BLD: 0 /100 WBCS — SIGNIFICANT CHANGE UP (ref 0–0)
PLATELET # BLD AUTO: 255 K/UL — SIGNIFICANT CHANGE UP (ref 130–400)
POTASSIUM SERPL-MCNC: 4.1 MMOL/L — SIGNIFICANT CHANGE UP (ref 3.5–5)
POTASSIUM SERPL-MCNC: 4.5 MMOL/L — SIGNIFICANT CHANGE UP (ref 3.5–5)
POTASSIUM SERPL-SCNC: 4.1 MMOL/L — SIGNIFICANT CHANGE UP (ref 3.5–5)
POTASSIUM SERPL-SCNC: 4.5 MMOL/L — SIGNIFICANT CHANGE UP (ref 3.5–5)
PROT SERPL-MCNC: 6 G/DL — SIGNIFICANT CHANGE UP (ref 6–8)
RBC # BLD: 4.45 M/UL — SIGNIFICANT CHANGE UP (ref 4.2–5.4)
RBC # FLD: 12.9 % — SIGNIFICANT CHANGE UP (ref 11.5–14.5)
SODIUM SERPL-SCNC: 120 MMOL/L — LOW (ref 135–146)
SODIUM SERPL-SCNC: 125 MMOL/L — LOW (ref 135–146)
WBC # BLD: 12.99 K/UL — HIGH (ref 4.8–10.8)
WBC # FLD AUTO: 12.99 K/UL — HIGH (ref 4.8–10.8)

## 2023-01-14 RX ORDER — TAMSULOSIN HYDROCHLORIDE 0.4 MG/1
0.4 CAPSULE ORAL AT BEDTIME
Refills: 0 | Status: DISCONTINUED | OUTPATIENT
Start: 2023-01-14 | End: 2023-01-17

## 2023-01-14 RX ORDER — SODIUM CHLORIDE 9 MG/ML
1000 INJECTION INTRAMUSCULAR; INTRAVENOUS; SUBCUTANEOUS
Refills: 0 | Status: DISCONTINUED | OUTPATIENT
Start: 2023-01-14 | End: 2023-01-15

## 2023-01-14 RX ORDER — POLYETHYLENE GLYCOL 3350 17 G/17G
17 POWDER, FOR SOLUTION ORAL DAILY
Refills: 0 | Status: DISCONTINUED | OUTPATIENT
Start: 2023-01-14 | End: 2023-01-17

## 2023-01-14 RX ORDER — BACITRACIN ZINC 500 UNIT/G
1 OINTMENT IN PACKET (EA) TOPICAL
Refills: 0 | Status: DISCONTINUED | OUTPATIENT
Start: 2023-01-14 | End: 2023-01-17

## 2023-01-14 RX ORDER — TAMSULOSIN HYDROCHLORIDE 0.4 MG/1
0.4 CAPSULE ORAL ONCE
Refills: 0 | Status: COMPLETED | OUTPATIENT
Start: 2023-01-14 | End: 2023-01-14

## 2023-01-14 RX ORDER — MORPHINE SULFATE 50 MG/1
2 CAPSULE, EXTENDED RELEASE ORAL ONCE
Refills: 0 | Status: DISCONTINUED | OUTPATIENT
Start: 2023-01-14 | End: 2023-01-14

## 2023-01-14 RX ORDER — LIDOCAINE 4 G/100G
1 CREAM TOPICAL DAILY
Refills: 0 | Status: DISCONTINUED | OUTPATIENT
Start: 2023-01-14 | End: 2023-01-17

## 2023-01-14 RX ADMIN — MORPHINE SULFATE 2 MILLIGRAM(S): 50 CAPSULE, EXTENDED RELEASE ORAL at 04:19

## 2023-01-14 RX ADMIN — MORPHINE SULFATE 4 MILLIGRAM(S): 50 CAPSULE, EXTENDED RELEASE ORAL at 00:03

## 2023-01-14 RX ADMIN — LIDOCAINE 1 PATCH: 4 CREAM TOPICAL at 19:24

## 2023-01-14 RX ADMIN — TAMSULOSIN HYDROCHLORIDE 0.4 MILLIGRAM(S): 0.4 CAPSULE ORAL at 06:42

## 2023-01-14 RX ADMIN — LIDOCAINE 1 PATCH: 4 CREAM TOPICAL at 23:55

## 2023-01-14 RX ADMIN — Medication 650 MILLIGRAM(S): at 00:03

## 2023-01-14 RX ADMIN — MORPHINE SULFATE 2 MILLIGRAM(S): 50 CAPSULE, EXTENDED RELEASE ORAL at 01:16

## 2023-01-14 RX ADMIN — MORPHINE SULFATE 2 MILLIGRAM(S): 50 CAPSULE, EXTENDED RELEASE ORAL at 16:45

## 2023-01-14 RX ADMIN — Medication 25 MILLIGRAM(S): at 06:43

## 2023-01-14 RX ADMIN — LIDOCAINE 1 PATCH: 4 CREAM TOPICAL at 12:34

## 2023-01-14 RX ADMIN — Medication 650 MILLIGRAM(S): at 00:00

## 2023-01-14 RX ADMIN — MORPHINE SULFATE 2 MILLIGRAM(S): 50 CAPSULE, EXTENDED RELEASE ORAL at 20:00

## 2023-01-14 RX ADMIN — MORPHINE SULFATE 2 MILLIGRAM(S): 50 CAPSULE, EXTENDED RELEASE ORAL at 19:44

## 2023-01-14 RX ADMIN — SIMVASTATIN 20 MILLIGRAM(S): 20 TABLET, FILM COATED ORAL at 21:29

## 2023-01-14 RX ADMIN — SIMVASTATIN 20 MILLIGRAM(S): 20 TABLET, FILM COATED ORAL at 00:03

## 2023-01-14 RX ADMIN — MORPHINE SULFATE 2 MILLIGRAM(S): 50 CAPSULE, EXTENDED RELEASE ORAL at 01:00

## 2023-01-14 RX ADMIN — ENOXAPARIN SODIUM 40 MILLIGRAM(S): 100 INJECTION SUBCUTANEOUS at 06:42

## 2023-01-14 RX ADMIN — MORPHINE SULFATE 2 MILLIGRAM(S): 50 CAPSULE, EXTENDED RELEASE ORAL at 15:32

## 2023-01-14 RX ADMIN — SODIUM CHLORIDE 100 MILLILITER(S): 9 INJECTION INTRAMUSCULAR; INTRAVENOUS; SUBCUTANEOUS at 12:35

## 2023-01-14 RX ADMIN — Medication 3 MILLIGRAM(S): at 00:02

## 2023-01-14 RX ADMIN — TAMSULOSIN HYDROCHLORIDE 0.4 MILLIGRAM(S): 0.4 CAPSULE ORAL at 21:29

## 2023-01-14 RX ADMIN — MORPHINE SULFATE 2 MILLIGRAM(S): 50 CAPSULE, EXTENDED RELEASE ORAL at 23:53

## 2023-01-14 RX ADMIN — BUDESONIDE AND FORMOTEROL FUMARATE DIHYDRATE 2 PUFF(S): 160; 4.5 AEROSOL RESPIRATORY (INHALATION) at 21:30

## 2023-01-14 RX ADMIN — POLYETHYLENE GLYCOL 3350 17 GRAM(S): 17 POWDER, FOR SOLUTION ORAL at 21:30

## 2023-01-14 RX ADMIN — MORPHINE SULFATE 2 MILLIGRAM(S): 50 CAPSULE, EXTENDED RELEASE ORAL at 04:00

## 2023-01-14 RX ADMIN — AMLODIPINE BESYLATE 10 MILLIGRAM(S): 2.5 TABLET ORAL at 06:42

## 2023-01-14 NOTE — PHYSICAL THERAPY INITIAL EVALUATION ADULT - PERTINENT HX OF CURRENT PROBLEM, REHAB EVAL
86yo female with PMHx of COPD (not on home O2) and HTN who presents with acute onset of Right knee pain after sustaining a fall. Found to have a R patellar fracture and joint effusion. Fall appears mechanical based on history with low concern for any concerning prodrome event.  Awaiting orthopedics reccs.   #Fall #R knee patella fracture

## 2023-01-14 NOTE — PHYSICAL THERAPY INITIAL EVALUATION ADULT - RANGE OF MOTION EXAMINATION, REHAB EVAL
L LE - ankle is WFL otherwise unable to assess due to immobilizer and restriction on ROM by ortho/Left LE ROM was WFL (within functional limits)

## 2023-01-14 NOTE — PHYSICAL THERAPY INITIAL EVALUATION ADULT - ADDITIONAL COMMENTS
pt lives with her  in a private house with 1 step to the front door and 7 steps to the first floor with rails, plus 7 steps to the BB.  Pt stated can stay on the first floor, she has a recliner and bathroom there. pt was independent community ambulator prior to admission without AD, pt was an active  prior to admission

## 2023-01-14 NOTE — PHYSICAL THERAPY INITIAL EVALUATION ADULT - GENERAL OBSERVATIONS, REHAB EVAL
10;50-11;15 pt was seen for PT IE at bed side, pt is agreeable, chart thoroughly reviewed, RN Carol is aware.  pt was received semi dahl in bed, in no apparent distress, +hep lock, +primafit, +call bell within reach, bed side table at reach, LLE immobilizer.

## 2023-01-15 LAB
ANION GAP SERPL CALC-SCNC: 11 MMOL/L — SIGNIFICANT CHANGE UP (ref 7–14)
BUN SERPL-MCNC: 6 MG/DL — LOW (ref 10–20)
CALCIUM SERPL-MCNC: 8.5 MG/DL — SIGNIFICANT CHANGE UP (ref 8.4–10.5)
CHLORIDE SERPL-SCNC: 93 MMOL/L — LOW (ref 98–110)
CO2 SERPL-SCNC: 24 MMOL/L — SIGNIFICANT CHANGE UP (ref 17–32)
CREAT SERPL-MCNC: 0.5 MG/DL — LOW (ref 0.7–1.5)
EGFR: 92 ML/MIN/1.73M2 — SIGNIFICANT CHANGE UP
GLUCOSE SERPL-MCNC: 114 MG/DL — HIGH (ref 70–99)
POTASSIUM SERPL-MCNC: 3.8 MMOL/L — SIGNIFICANT CHANGE UP (ref 3.5–5)
POTASSIUM SERPL-SCNC: 3.8 MMOL/L — SIGNIFICANT CHANGE UP (ref 3.5–5)
SODIUM SERPL-SCNC: 128 MMOL/L — LOW (ref 135–146)

## 2023-01-15 RX ORDER — SODIUM CHLORIDE 9 MG/ML
1000 INJECTION INTRAMUSCULAR; INTRAVENOUS; SUBCUTANEOUS
Refills: 0 | Status: DISCONTINUED | OUTPATIENT
Start: 2023-01-15 | End: 2023-01-16

## 2023-01-15 RX ADMIN — POLYETHYLENE GLYCOL 3350 17 GRAM(S): 17 POWDER, FOR SOLUTION ORAL at 21:01

## 2023-01-15 RX ADMIN — Medication 1 APPLICATION(S): at 05:08

## 2023-01-15 RX ADMIN — BUDESONIDE AND FORMOTEROL FUMARATE DIHYDRATE 2 PUFF(S): 160; 4.5 AEROSOL RESPIRATORY (INHALATION) at 09:52

## 2023-01-15 RX ADMIN — AMLODIPINE BESYLATE 10 MILLIGRAM(S): 2.5 TABLET ORAL at 05:08

## 2023-01-15 RX ADMIN — Medication 1 APPLICATION(S): at 17:01

## 2023-01-15 RX ADMIN — LIDOCAINE 1 PATCH: 4 CREAM TOPICAL at 11:12

## 2023-01-15 RX ADMIN — TAMSULOSIN HYDROCHLORIDE 0.4 MILLIGRAM(S): 0.4 CAPSULE ORAL at 21:01

## 2023-01-15 RX ADMIN — MORPHINE SULFATE 2 MILLIGRAM(S): 50 CAPSULE, EXTENDED RELEASE ORAL at 04:56

## 2023-01-15 RX ADMIN — Medication 25 MILLIGRAM(S): at 05:08

## 2023-01-15 RX ADMIN — MORPHINE SULFATE 2 MILLIGRAM(S): 50 CAPSULE, EXTENDED RELEASE ORAL at 00:15

## 2023-01-15 RX ADMIN — SIMVASTATIN 20 MILLIGRAM(S): 20 TABLET, FILM COATED ORAL at 21:01

## 2023-01-15 RX ADMIN — BUDESONIDE AND FORMOTEROL FUMARATE DIHYDRATE 2 PUFF(S): 160; 4.5 AEROSOL RESPIRATORY (INHALATION) at 21:01

## 2023-01-15 RX ADMIN — LIDOCAINE 1 PATCH: 4 CREAM TOPICAL at 21:04

## 2023-01-16 ENCOUNTER — TRANSCRIPTION ENCOUNTER (OUTPATIENT)
Age: 88
End: 2023-01-16

## 2023-01-16 LAB
ANION GAP SERPL CALC-SCNC: 8 MMOL/L — SIGNIFICANT CHANGE UP (ref 7–14)
BUN SERPL-MCNC: 4 MG/DL — LOW (ref 10–20)
CALCIUM SERPL-MCNC: 8.1 MG/DL — LOW (ref 8.4–10.5)
CHLORIDE SERPL-SCNC: 98 MMOL/L — SIGNIFICANT CHANGE UP (ref 98–110)
CO2 SERPL-SCNC: 25 MMOL/L — SIGNIFICANT CHANGE UP (ref 17–32)
CREAT SERPL-MCNC: <0.5 MG/DL — LOW (ref 0.7–1.5)
EGFR: 97 ML/MIN/1.73M2 — SIGNIFICANT CHANGE UP
GLUCOSE SERPL-MCNC: 103 MG/DL — HIGH (ref 70–99)
POTASSIUM SERPL-MCNC: 3.9 MMOL/L — SIGNIFICANT CHANGE UP (ref 3.5–5)
POTASSIUM SERPL-SCNC: 3.9 MMOL/L — SIGNIFICANT CHANGE UP (ref 3.5–5)
SARS-COV-2 RNA SPEC QL NAA+PROBE: SIGNIFICANT CHANGE UP
SODIUM SERPL-SCNC: 131 MMOL/L — LOW (ref 135–146)

## 2023-01-16 RX ORDER — AMLODIPINE BESYLATE 2.5 MG/1
1 TABLET ORAL
Qty: 0 | Refills: 0 | DISCHARGE
Start: 2023-01-16

## 2023-01-16 RX ORDER — ALBUTEROL 90 UG/1
2 AEROSOL, METERED ORAL
Qty: 1 | Refills: 0
Start: 2023-01-16 | End: 2023-02-14

## 2023-01-16 RX ORDER — SIMVASTATIN 20 MG/1
1 TABLET, FILM COATED ORAL
Qty: 0 | Refills: 0 | DISCHARGE
Start: 2023-01-16

## 2023-01-16 RX ORDER — LIDOCAINE 4 G/100G
1 CREAM TOPICAL
Qty: 0 | Refills: 0 | DISCHARGE
Start: 2023-01-16

## 2023-01-16 RX ORDER — AMLODIPINE BESYLATE 2.5 MG/1
2 TABLET ORAL
Qty: 0 | Refills: 0 | DISCHARGE

## 2023-01-16 RX ORDER — BUDESONIDE AND FORMOTEROL FUMARATE DIHYDRATE 160; 4.5 UG/1; UG/1
1 AEROSOL RESPIRATORY (INHALATION)
Qty: 0 | Refills: 0 | DISCHARGE
Start: 2023-01-16

## 2023-01-16 RX ORDER — SIMVASTATIN 20 MG/1
1 TABLET, FILM COATED ORAL
Qty: 0 | Refills: 0 | DISCHARGE

## 2023-01-16 RX ORDER — OXYCODONE AND ACETAMINOPHEN 5; 325 MG/1; MG/1
1 TABLET ORAL
Qty: 0 | Refills: 0 | DISCHARGE
Start: 2023-01-16

## 2023-01-16 RX ORDER — POLYETHYLENE GLYCOL 3350 17 G/17G
17 POWDER, FOR SOLUTION ORAL
Qty: 0 | Refills: 0 | DISCHARGE
Start: 2023-01-16

## 2023-01-16 RX ADMIN — Medication 25 MILLIGRAM(S): at 06:27

## 2023-01-16 RX ADMIN — ENOXAPARIN SODIUM 40 MILLIGRAM(S): 100 INJECTION SUBCUTANEOUS at 06:28

## 2023-01-16 RX ADMIN — BUDESONIDE AND FORMOTEROL FUMARATE DIHYDRATE 2 PUFF(S): 160; 4.5 AEROSOL RESPIRATORY (INHALATION) at 11:44

## 2023-01-16 RX ADMIN — POLYETHYLENE GLYCOL 3350 17 GRAM(S): 17 POWDER, FOR SOLUTION ORAL at 21:20

## 2023-01-16 RX ADMIN — AMLODIPINE BESYLATE 10 MILLIGRAM(S): 2.5 TABLET ORAL at 06:27

## 2023-01-16 RX ADMIN — SIMVASTATIN 20 MILLIGRAM(S): 20 TABLET, FILM COATED ORAL at 21:19

## 2023-01-16 RX ADMIN — LIDOCAINE 1 PATCH: 4 CREAM TOPICAL at 11:43

## 2023-01-16 RX ADMIN — Medication 1 APPLICATION(S): at 06:29

## 2023-01-16 RX ADMIN — Medication 1 APPLICATION(S): at 17:46

## 2023-01-16 RX ADMIN — TAMSULOSIN HYDROCHLORIDE 0.4 MILLIGRAM(S): 0.4 CAPSULE ORAL at 21:19

## 2023-01-16 NOTE — DISCHARGE NOTE PROVIDER - NSDCFUADDINST_GEN_ALL_CORE_FT
weight bear as tolerated while wearing knee immobilizer, and with assistance as necessary.  Weight bear as tolerated while wearing knee immobilizer as per Orthopedics recommendation:  Bledso Brace for right leg    This discharge and all meds discussed with Dr Sharan Sanchez Weight bear as tolerated while wearing knee immobilizer as per Orthopedics recommendation:  Bledso Brace for right leg: to be locked in full extension    This discharge and all meds discussed with Dr Sharan Sanchez

## 2023-01-16 NOTE — DISCHARGE NOTE PROVIDER - HOSPITAL COURSE
87 year old female presented with sudden onset of right knee pain following loss of balance causing a fall. Patient was found to have a patellar fracture. Patient was evaluated by orthopedics, who recommended a knee immobilizer and avoiding range of motion of the right knee. Per physical therapy evaluation, patient would benefit from subacute rehab. Patient has been prescribed percocet for pain management. Weight bearing is allowed, while wearing the knee immobilizer. Please follow up with orthopedic clinic as scheduled. Patient was also found to be hyponatremic, which improved with IV fluid administration.       < from: Xray Knee 4 Views, Right (01.13.23 @ 14:47) >  Findings/  impression:  Osteopenia. Lucencies through the patella, suggestive of fracture. Large   knee joint effusion. Recommend CT evaluation.  Severe degenerative changes of the knee. Vascular calcifications.    < from: Xray Pelvis AP only (01.13.23 @ 16:46) >  Findings:  No evidence of acute fracture or dislocation. Degenerative change of   bilateral hips and lower lumbar spine. Osteopenia.  Impression:  No evidence of acute fracture. Osteopenia.           87 year old female presented with sudden onset of right knee pain following loss of balance causing a fall. Patient was found to have a patellar fracture. Patient was evaluated by orthopedics, who recommended a knee immobilizer and avoiding range of motion of the right knee. Per physical therapy evaluation, patient would benefit from subacute rehab. Patient has been prescribed percocet for pain management. Weight bearing is allowed, while wearing the knee immobilizer. Please follow up with orthopedic clinic as scheduled. Patient was also found to be hyponatremic, which improved with IV fluid administration.        Xray Knee 4 Views, Right (01.13.23 @ 14:47) >  Findings/  impression:  Osteopenia. Lucencies through the patella, suggestive of fracture. Large   knee joint effusion. Recommend CT evaluation.  Severe degenerative changes of the knee. Vascular calcifications.    Xray Pelvis AP only (01.13.23 @ 16:46) >  Findings:  No evidence of acute fracture or dislocation. Degenerative change of   bilateral hips and lower lumbar spine. Osteopenia.  Impression:  No evidence of acute fracture. Osteopenia.

## 2023-01-16 NOTE — DISCHARGE NOTE PROVIDER - NSDCFUSCHEDAPPT_GEN_ALL_CORE_FT
[facility]  [address]  Scheduled Appointment: 03/06/2023

## 2023-01-16 NOTE — DISCHARGE NOTE PROVIDER - NSDCMRMEDTOKEN_GEN_ALL_CORE_FT
amLODIPine 10 mg oral tablet: 1 tab(s) orally once a day  budesonide-formoterol 160 mcg-4.5 mcg/inh inhalation aerosol: 1 puff(s) inhaled 2 times a day   mg oral tablet: 1 tab(s) orally 3 times a day, As Needed MDD:3  lidocaine 4% topical film: Apply topically to affected area once a day 12hrs on then 12 Hrs off  melatonin 3 mg oral tablet: 1 tab(s) orally once a day (at bedtime), As needed, Insomnia  metoprolol succinate 25 mg oral tablet, extended release: 1 tab(s) orally once a day  oxycodone-acetaminophen 5 mg-325 mg oral tablet: 1 tab(s) orally every 6 hours, As Needed - 6) for pain relief  polyethylene glycol 3350 oral powder for reconstitution: 17 gram(s) orally once a day, As needed, Constipation  simvastatin 20 mg oral tablet: 1 tab(s) orally once a day (at bedtime)  Trelegy Ellipta 100 mcg-62.5 mcg-25 mcg/inh inhalation powder: 1 puff(s) inhaled once a day  Vitamin B12 1000 mcg oral tablet: 1 tab(s) orally once a day  Vitamin D3 25 mcg (1000 intl units) oral tablet: 1 tab(s) orally once a day   amLODIPine 10 mg oral tablet: 1 tab(s) orally once a day  budesonide-formoterol 160 mcg-4.5 mcg/inh inhalation aerosol: 1 puff(s) inhaled 2 times a day  lidocaine 4% topical film: Apply topically to affected area once a day 12hrs on then 12 Hrs off  melatonin 3 mg oral tablet: 1 tab(s) orally once a day (at bedtime), As needed, Insomnia  metoprolol succinate 25 mg oral tablet, extended release: 1 tab(s) orally once a day  oxycodone-acetaminophen 5 mg-325 mg oral tablet: 1 tab(s) orally every 6 hours, As Needed - 6) for pain relief  polyethylene glycol 3350 oral powder for reconstitution: 17 gram(s) orally once a day, As needed, Constipation  simvastatin 20 mg oral tablet: 1 tab(s) orally once a day (at bedtime)  Trelegy Ellipta 100 mcg-62.5 mcg-25 mcg/inh inhalation powder: 1 puff(s) inhaled once a day  Vitamin B12 1000 mcg oral tablet: 1 tab(s) orally once a day  Vitamin D3 25 mcg (1000 intl units) oral tablet: 1 tab(s) orally once a day

## 2023-01-16 NOTE — DISCHARGE NOTE PROVIDER - CARE PROVIDER_API CALL
Malina Maldonado)  Medicine  [facility] [place] [address]  Phone: [phone]  Fax: [phone]  Follow Up Time: 2 weeks

## 2023-01-16 NOTE — CHART NOTE - NSCHARTNOTEFT_GEN_A_CORE
called by nurse pt c/o morphine 2 is doing anything for her pain tramadol is not working either.  Plan morphine 4 mg x 1 dose as per patient request
Knee krysten brace should be locked in extension  f/u with dr Denis in 1 week, [phone]
pt  asking something stronger than tramadol.  .Morphine as per patient request

## 2023-01-16 NOTE — DISCHARGE NOTE PROVIDER - NSDCCPCAREPLAN_GEN_ALL_CORE_FT
PRINCIPAL DISCHARGE DIAGNOSIS  Diagnosis: Closed fracture of patella  Assessment and Plan of Treatment: right patella fracture. Please wear knee immobilizer as directed.      SECONDARY DISCHARGE DIAGNOSES  Diagnosis: Hyponatremia  Assessment and Plan of Treatment: improved with IV fluid     PRINCIPAL DISCHARGE DIAGNOSIS  Diagnosis: Closed fracture of patella  Assessment and Plan of Treatment: right patella fracture. Please wear knee immobilizer as directed.      SECONDARY DISCHARGE DIAGNOSES  Diagnosis: Hyponatremia  Assessment and Plan of Treatment: improved with IV fluid:   Na 131 0n 1/16/2023     PRINCIPAL DISCHARGE DIAGNOSIS  Diagnosis: Closed fracture of patella  Assessment and Plan of Treatment: right patella fracture. Please wear knee immobilizer: B[place]so Brace: To be locked in full extension as per Orthopedics.      SECONDARY DISCHARGE DIAGNOSES  Diagnosis: Hyponatremia  Assessment and Plan of Treatment: improved with IV fluid:   Na 131 0n 1/16/2023

## 2023-01-17 ENCOUNTER — TRANSCRIPTION ENCOUNTER (OUTPATIENT)
Age: 88
End: 2023-01-17

## 2023-01-17 VITALS
SYSTOLIC BLOOD PRESSURE: 118 MMHG | RESPIRATION RATE: 16 BRPM | OXYGEN SATURATION: 95 % | DIASTOLIC BLOOD PRESSURE: 58 MMHG | HEART RATE: 86 BPM | TEMPERATURE: 96 F

## 2023-01-17 RX ADMIN — LIDOCAINE 1 PATCH: 4 CREAM TOPICAL at 12:13

## 2023-01-17 RX ADMIN — ENOXAPARIN SODIUM 40 MILLIGRAM(S): 100 INJECTION SUBCUTANEOUS at 05:26

## 2023-01-17 RX ADMIN — AMLODIPINE BESYLATE 10 MILLIGRAM(S): 2.5 TABLET ORAL at 05:15

## 2023-01-17 RX ADMIN — LIDOCAINE 1 PATCH: 4 CREAM TOPICAL at 06:49

## 2023-01-17 RX ADMIN — Medication 1 APPLICATION(S): at 05:15

## 2023-01-17 RX ADMIN — Medication 25 MILLIGRAM(S): at 05:15

## 2023-01-17 NOTE — PROGRESS NOTE ADULT - REASON FOR ADMISSION
Fall with knee pain and patellar fracture

## 2023-01-17 NOTE — PROGRESS NOTE ADULT - SUBJECTIVE AND OBJECTIVE BOX
DENMARIXA  87y  Female      Patient is a 87y old  Female who presents with a chief complaint of Fall with knee pain and patellar fracture (16 Jan 2023 08:44)        REVIEW OF SYSTEMS:  CONSTITUTIONAL: No fever, weight loss, or fatigue  RESPIRATORY: No cough, wheezing, chills or hemoptysis; No shortness of breath  CARDIOVASCULAR: No chest pain, palpitations, dizziness, or leg swelling  GASTROINTESTINAL: No abdominal or epigastric pain. No nausea, vomiting, or hematemesis; No diarrhea or constipation. No melena or hematochezia.  GENITOURINARY: No dysuria, frequency, hematuria, or incontinence  MUSCULOSKELETAL: No joint pain or swelling; No muscle, back, rt knee extremity pain+  PSYCHIATRIC: No depression, anxiety, mood swings, or difficulty sleeping  HEME/LYMPH: No easy bruising, or bleeding gums  ALLERY AND IMMUNOLOGIC: No hives or eczema  FAMILY HISTORY:    T(C): 35.7 (01-16-23 @ 05:00), Max: 36.8 (01-15-23 @ 14:00)  HR: 77 (01-16-23 @ 05:00) (77 - 80)  BP: 109/57 (01-16-23 @ 05:00) (105/51 - 110/59)  RR: 18 (01-16-23 @ 05:00) (18 - 18)  SpO2: 96% (01-16-23 @ 05:00) (95% - 96%)  Wt(kg): --Vital Signs Last 24 Hrs  T(C): 35.7 (16 Jan 2023 05:00), Max: 36.8 (15 Randell 2023 14:00)  T(F): 96.2 (16 Jan 2023 05:00), Max: 98.2 (15 Randell 2023 14:00)  HR: 77 (16 Jan 2023 05:00) (77 - 80)  BP: 109/57 (16 Jan 2023 05:00) (105/51 - 110/59)  BP(mean): --  RR: 18 (16 Jan 2023 05:00) (18 - 18)  SpO2: 96% (16 Jan 2023 05:00) (95% - 96%)    Parameters below as of 16 Jan 2023 05:00  Patient On (Oxygen Delivery Method): room air      codeine (Nausea)  No Known Allergies      PHYSICAL EXAM:  GENERAL: NAD,   HEAD:  Atraumatic, Normocephalic  EYES: EOMI, PERRLA, conjunctiva and sclera clear  ENMT: No tonsillar erythema, exudates, or enlargement;   NECK: Supple, No JVD, Normal thyroid  NERVOUS SYSTEM:  Alert & Oriented X3,   CHEST/LUNG: Clear to percussion bilaterally; No rales, rhonchi, wheezing, or rubs  HEART: Regular rate and rhythm; No murmurs, rubs, or gallops  ABDOMEN: Soft, Nontender, Nondistended; Bowel sounds present  EXTREMITIES: , No clubbing, cyanosis, or edema  LYMPH: No lymphadenopathy noted  SKIN: No rashes or lesions      LABS:  01-16    131<L>  |  98  |  4<L>  ----------------------------<  103<H>  3.9   |  25  |  <0.5<L>    Ca    8.1<L>      16 Jan 2023 07:18            RADIOLOGY & ADDITIONAL TESTS:    MEDICATION:  acetaminophen     Tablet .. 650 milliGRAM(s) Oral every 6 hours PRN  aluminum hydroxide/magnesium hydroxide/simethicone Suspension 30 milliLiter(s) Oral every 4 hours PRN  amLODIPine   Tablet 10 milliGRAM(s) Oral daily  bacitracin   Ointment 1 Application(s) Topical two times a day  budesonide 160 MICROgram(s)/formoterol 4.5 MICROgram(s) Inhaler 2 Puff(s) Inhalation two times a day  enoxaparin Injectable 40 milliGRAM(s) SubCutaneous every 24 hours  lidocaine   4% Patch 1 Patch Transdermal daily  melatonin 3 milliGRAM(s) Oral at bedtime PRN  metoprolol succinate ER 25 milliGRAM(s) Oral daily  morphine  - Injectable 2 milliGRAM(s) IV Push every 4 hours PRN  ondansetron Injectable 4 milliGRAM(s) IV Push every 8 hours PRN  oxycodone    5 mG/acetaminophen 325 mG 1 Tablet(s) Oral every 4 hours PRN  polyethylene glycol 3350 17 Gram(s) Oral daily PRN  simvastatin 20 milliGRAM(s) Oral at bedtime  tamsulosin 0.4 milliGRAM(s) Oral at bedtime      HEALTH ISSUES - PROBLEM Dx:    s/p fall rt patella fracture knee immobilzer,need to go to rehab if bed available d/c   hyponatremia improving will d/c intravenous fluids  HTN on amlodipine  copd on budesonide    
  DENMARIXA  87y  Female      Patient is a 87y old  Female who presents with a chief complaint of Fall with knee pain and patellar fracture (14 Jan 2023 09:20)        REVIEW OF SYSTEMS:  CONSTITUTIONAL: No fever, weight loss, or fatigue  RESPIRATORY: No cough, wheezing, chills or hemoptysis; No shortness of breath  CARDIOVASCULAR: No chest pain, palpitations, dizziness, or leg swelling  GASTROINTESTINAL: No abdominal or epigastric pain. No nausea, vomiting, or hematemesis; No diarrhea or constipation. No melena or hematochezia.  GENITOURINARY: No dysuria, frequency, hematuria, or incontinence  MUSCULOSKELETAL: No joint pain or swelling; No muscle, back,rt knee extremity pain+  PSYCHIATRIC: No depression, anxiety, mood swings, or difficulty sleeping  HEME/LYMPH: No easy bruising, or bleeding gums  ALLERY AND IMMUNOLOGIC: No hives or eczema  FAMILY HISTORY:    T(C): 35.9 (01-15-23 @ 05:00), Max: 35.9 (01-14-23 @ 20:39)  HR: 83 (01-15-23 @ 05:00) (79 - 83)  BP: 111/53 (01-15-23 @ 05:00) (107/52 - 113/56)  RR: 18 (01-15-23 @ 05:00) (18 - 18)  SpO2: 97% (01-15-23 @ 05:00) (97% - 97%)  Wt(kg): --Vital Signs Last 24 Hrs  T(C): 35.9 (15 Randell 2023 05:00), Max: 35.9 (14 Jan 2023 20:39)  T(F): 96.7 (15 Randell 2023 05:00), Max: 96.7 (15 Randell 2023 05:00)  HR: 83 (15 Randell 2023 05:00) (79 - 83)  BP: 111/53 (15 Randell 2023 05:00) (107/52 - 113/56)  BP(mean): --  RR: 18 (15 Randell 2023 05:00) (18 - 18)  SpO2: 97% (15 Randell 2023 05:00) (97% - 97%)    Parameters below as of 15 Randell 2023 05:00  Patient On (Oxygen Delivery Method): room air      codeine (Nausea)  No Known Allergies      PHYSICAL EXAM:  GENERAL: NAD,   HEAD:  Atraumatic, Normocephalic  EYES: EOMI, PERRLA, conjunctiva and sclera clear  ENMT: No tonsillar erythema, exudates, or enlargement;   NECK: Supple, No JVD, Normal thyroid  NERVOUS SYSTEM:  Alert   CHEST/LUNG: vbs  HEART: Regular rate and rhythm; No murmurs, rubs, or gallops  ABDOMEN: Soft, Nontender, Nondistended; Bowel sounds present  EXTREMITIES:  , No clubbing, cyanosis, or edema  LYMPH: No lymphadenopathy noted  SKIN: No rashes or lesions      LABS:  01-15    128<L>  |  93<L>  |  6<L>  ----------------------------<  114<H>  3.8   |  24  |  0.5<L>    Ca    8.5      15 Randell 2023 06:31    TPro  6.0  /  Alb  3.8  /  TBili  1.3<H>  /  DBili  x   /  AST  27  /  ALT  12  /  AlkPhos  74  01-14                            14.3   12.99 )-----------( 255      ( 14 Jan 2023 07:02 )             41.0       RADIOLOGY & ADDITIONAL TESTS:    MEDICATION:  acetaminophen     Tablet .. 650 milliGRAM(s) Oral every 6 hours PRN  aluminum hydroxide/magnesium hydroxide/simethicone Suspension 30 milliLiter(s) Oral every 4 hours PRN  amLODIPine   Tablet 10 milliGRAM(s) Oral daily  bacitracin   Ointment 1 Application(s) Topical two times a day  budesonide 160 MICROgram(s)/formoterol 4.5 MICROgram(s) Inhaler 2 Puff(s) Inhalation two times a day  enoxaparin Injectable 40 milliGRAM(s) SubCutaneous every 24 hours  lidocaine   4% Patch 1 Patch Transdermal daily  melatonin 3 milliGRAM(s) Oral at bedtime PRN  metoprolol succinate ER 25 milliGRAM(s) Oral daily  morphine  - Injectable 2 milliGRAM(s) IV Push every 4 hours PRN  ondansetron Injectable 4 milliGRAM(s) IV Push every 8 hours PRN  oxycodone    5 mG/acetaminophen 325 mG 1 Tablet(s) Oral every 4 hours PRN  polyethylene glycol 3350 17 Gram(s) Oral daily PRN  simvastatin 20 milliGRAM(s) Oral at bedtime  sodium chloride 0.9%. 1000 milliLiter(s) IV Continuous <Continuous>  tamsulosin 0.4 milliGRAM(s) Oral at bedtime      HEALTH ISSUES - PROBLEM Dx:  Rt patella fracture on knee immobilizer ,in pain on morphine,percocet,need snf  hyponatremia on intravenous fluids  HTN on amlodipine,  copd on budesonide      
  [facility] MARIXA  87y  Female      Patient is a 87y old  Female who presents with a chief complaint of Fall with knee pain and patellar fracture (13 Jan 2023 19:29)        REVIEW OF SYSTEMS:  CONSTITUTIONAL: No fever, weight loss, or fatigue  RESPIRATORY: No cough, wheezing, chills or hemoptysis; No shortness of breath  CARDIOVASCULAR: No chest pain, palpitations, dizziness, or leg swelling  GASTROINTESTINAL: No abdominal or epigastric pain. No nausea, vomiting, or hematemesis; No diarrhea or constipation. No melena or hematochezia.  GENITOURINARY: No dysuria, frequency, hematuria, or incontinence  MUSCULOSKELETAL: No joint pain or swelling; No muscle, back, or extremity pain  PSYCHIATRIC: No depression, anxiety, mood swings, or difficulty sleeping  HEME/LYMPH: No easy bruising, or bleeding gums  ALLERY AND IMMUNOLOGIC: No hives or eczema  FAMILY HISTORY:    T(C): 35.7 (01-13-23 @ 21:39), Max: 35.9 (01-13-23 @ 18:55)  HR: 95 (01-14-23 @ 04:53) (64 - 95)  BP: 130/64 (01-14-23 @ 04:53) (124/69 - 156/70)  RR: 18 (01-14-23 @ 04:53) (18 - 20)  SpO2: 97% (01-13-23 @ 21:39) (95% - 99%)  Wt(kg): --Vital Signs Last 24 Hrs  T(C): 35.7 (13 Jan 2023 21:39), Max: 35.9 (13 Jan 2023 18:55)  T(F): 96.2 (13 Jan 2023 21:39), Max: 96.7 (13 Jan 2023 18:55)  HR: 95 (14 Jan 2023 04:53) (64 - 95)  BP: 130/64 (14 Jan 2023 04:53) (124/69 - 156/70)  BP(mean): --  RR: 18 (14 Jan 2023 04:53) (18 - 20)  SpO2: 97% (13 Jan 2023 21:39) (95% - 99%)    Parameters below as of 13 Jan 2023 14:02  Patient On (Oxygen Delivery Method): room air      codeine (Nausea)  No Known Allergies      PHYSICAL EXAM:  GENERAL: NAD,   HEAD:  Atraumatic, Normocephalic  EYES: EOMI, PERRLA, conjunctiva and sclera clear  ENMT: No tonsillar erythema, exudates, or enlargement;   NECK: Supple, No JVD, Normal thyroid  NERVOUS SYSTEM:  Alert & Oriented   CHEST/LUNG: Clear to percussion bilaterally; No rales, rhonchi, wheezing, or rubs  HEART: Regular rate and rhythm; No murmurs, rubs, or gallops  ABDOMEN: Soft, Nontender, Nondistended; Bowel sounds present  EXTREMITIES:  , No clubbing, cyanosis, or edema lt knee brusie+  LYMPH: No lymphadenopathy noted  SKIN: No rashes or lesions      LABS:  01-14    125<L>  |  88<L>  |  8<L>  ----------------------------<  96  4.1   |  25  |  0.6<L>    Ca    9.3      14 Jan 2023 07:02    TPro  6.0  /  Alb  3.8  /  TBili  1.3<H>  /  DBili  x   /  AST  27  /  ALT  12  /  AlkPhos  74  01-14                          14.3   12.99 )-----------( 255      ( 14 Jan 2023 07:02 )             41.0         RADIOLOGY & ADDITIONAL TESTS:    < from: Xray Pelvis AP only (01.13.23 @ 16:46) >  No evidence of acute fracture. Osteopenia.    < end of copied text >  < from: Xray Knee 4 Views, Right (01.13.23 @ 14:47) >  Osteopenia. Lucencies through the patella, suggestive of fracture. Large   knee joint effusion. Recommend CT evaluation.    Severe degenerative changes of the knee. Vascular calcifications.      < end of copied text >  MEDICATION:  acetaminophen     Tablet .. 650 milliGRAM(s) Oral every 6 hours PRN  aluminum hydroxide/magnesium hydroxide/simethicone Suspension 30 milliLiter(s) Oral every 4 hours PRN  amLODIPine   Tablet 10 milliGRAM(s) Oral daily  budesonide 160 MICROgram(s)/formoterol 4.5 MICROgram(s) Inhaler 2 Puff(s) Inhalation two times a day  enoxaparin Injectable 40 milliGRAM(s) SubCutaneous every 24 hours  lidocaine   4% Patch 1 Patch Transdermal daily  melatonin 3 milliGRAM(s) Oral at bedtime PRN  metoprolol succinate ER 25 milliGRAM(s) Oral daily  morphine  - Injectable 2 milliGRAM(s) IV Push every 4 hours PRN  ondansetron Injectable 4 milliGRAM(s) IV Push every 8 hours PRN  oxycodone    5 mG/acetaminophen 325 mG 1 Tablet(s) Oral every 4 hours PRN  simvastatin 20 milliGRAM(s) Oral at bedtime  sodium chloride 0.9%. 1000 milliLiter(s) IV Continuous <Continuous>  tamsulosin 0.4 milliGRAM(s) Oral at bedtime      HEALTH ISSUES - PROBLEM Dx:  Rt Patella fracture orthopedist note appreciated ,knee immobilzer  hyponatremia on intravenous nacl  HTN on amlodipine  hypercholestrolemia on simvastatin      
  [facility]      Patient is a 87y old  Female who presents with a chief complaint of Fall with knee pain and patellar fracture (16 Jan 2023 09:07)        REVIEW OF SYSTEMS:  CONSTITUTIONAL: No fever, weight loss, or fatigue  RESPIRATORY: No cough, wheezing, chills or hemoptysis; No shortness of breath  CARDIOVASCULAR: No chest pain, palpitations, dizziness, or leg swelling  GASTROINTESTINAL: No abdominal or epigastric pain. No nausea, vomiting, or hematemesis; No diarrhea or constipation. No melena or hematochezia.  GENITOURINARY: No dysuria, frequency, hematuria, or incontinence  NEUROLOGICAL: No headaches, memory loss, loss of strength, numbness, or tremors  SKIN: No itching, burning, rashes, or lesions   MUSCULOSKELETAL: No joint pain or swelling; No muscle, back, or extremity pain  PSYCHIATRIC: No depression, anxiety, mood swings, or difficulty sleeping  HEME/LYMPH: No easy bruising, or bleeding gums  ALLERY AND IMMUNOLOGIC: No hives or eczema  FAMILY HISTORY:    T(C): 36.4 (01-17-23 @ 05:12), Max: 36.4 (01-16-23 @ 21:23)  HR: 80 (01-17-23 @ 05:12) (80 - 85)  BP: 127/60 (01-17-23 @ 05:12) (96/54 - 130/60)  RR: 16 (01-17-23 @ 05:12) (16 - 18)  SpO2: 96% (01-16-23 @ 21:23) (96% - 96%)  Wt(kg): --Vital Signs Last 24 Hrs  T(C): 36.4 (17 Jan 2023 05:12), Max: 36.4 (16 Jan 2023 21:23)  T(F): 97.6 (17 Jan 2023 05:12), Max: 97.6 (16 Jan 2023 21:23)  HR: 80 (17 Jan 2023 05:12) (80 - 85)  BP: 127/60 (17 Jan 2023 05:12) (96/54 - 130/60)  BP(mean): --  RR: 16 (17 Jan 2023 05:12) (16 - 18)  SpO2: 96% (16 Jan 2023 21:23) (96% - 96%)      codeine (Nausea)  No Known Allergies      PHYSICAL EXAM:  GENERAL: NAD,   HEAD:  Atraumatic, Normocephalic  EYES: EOMI, PERRLA, conjunctiva and sclera clear  ENMT: No tonsillar erythema, exudates, or enlargement;  NECK: Supple, No JVD, Normal thyroid  NERVOUS SYSTEM:  Alert & Oriented X3,   CHEST/LUNG: Clear to percussion bilaterally; No rales, rhonchi, wheezing, or rubs  HEART: Regular rate and rhythm; No murmurs, rubs, or gallops  ABDOMEN: Soft, Nontender, Nondistended; Bowel sounds present  EXTREMITIES:  , No clubbing, cyanosis, or edema  LYMPH: No lymphadenopathy noted  SKIN: No rashes or lesions      LABS:  01-16    131<L>  |  98  |  4<L>  ----------------------------<  103<H>  3.9   |  25  |  <0.5<L>    Ca    8.1<L>      16 Jan 2023 07:18            RADIOLOGY & ADDITIONAL TESTS:    MEDICATION:  acetaminophen     Tablet .. 650 milliGRAM(s) Oral every 6 hours PRN  aluminum hydroxide/magnesium hydroxide/simethicone Suspension 30 milliLiter(s) Oral every 4 hours PRN  amLODIPine   Tablet 10 milliGRAM(s) Oral daily  bacitracin   Ointment 1 Application(s) Topical two times a day  budesonide 160 MICROgram(s)/formoterol 4.5 MICROgram(s) Inhaler 2 Puff(s) Inhalation two times a day  enoxaparin Injectable 40 milliGRAM(s) SubCutaneous every 24 hours  lidocaine   4% Patch 1 Patch Transdermal daily  melatonin 3 milliGRAM(s) Oral at bedtime PRN  metoprolol succinate ER 25 milliGRAM(s) Oral daily  morphine  - Injectable 2 milliGRAM(s) IV Push every 4 hours PRN  ondansetron Injectable 4 milliGRAM(s) IV Push every 8 hours PRN  oxycodone    5 mG/acetaminophen 325 mG 1 Tablet(s) Oral every 4 hours PRN  polyethylene glycol 3350 17 Gram(s) Oral daily PRN  simvastatin 20 milliGRAM(s) Oral at bedtime  tamsulosin 0.4 milliGRAM(s) Oral at bedtime      HEALTH ISSUES - PROBLEM Dx:    s/p fall rt patella fracture knee immoblizer will d/c to snf today  hyponatremia improved  HTN on amlodipine,metoprolol  copd on budesonide

## 2023-01-17 NOTE — DISCHARGE NOTE NURSING/CASE MANAGEMENT/SOCIAL WORK - PATIENT PORTAL LINK FT
You can access the FollowMyHealth Patient Portal offered by [facility] by registering at the following website: [email or web address]. By joining [facility]’s FollowMyHealth portal, you will also be able to view your health information using other applications (apps) compatible with our system.

## 2023-01-19 NOTE — DISCUSSION/SUMMARY
[FreeTextEntry1] : from a pulm perspective -mod risk for the planned orthopedic procedure-ie hip replacement
abdominal pain

## 2023-01-20 DIAGNOSIS — Z98.41 CATARACT EXTRACTION STATUS, RIGHT EYE: ICD-10-CM

## 2023-01-20 DIAGNOSIS — M25.461 EFFUSION, RIGHT KNEE: ICD-10-CM

## 2023-01-20 DIAGNOSIS — J44.9 CHRONIC OBSTRUCTIVE PULMONARY DISEASE, UNSPECIFIED: ICD-10-CM
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
## 2023-01-20 DIAGNOSIS — Z20.822 CONTACT WITH AND (SUSPECTED) EXPOSURE TO COVID-19: ICD-10-CM

## 2023-01-20 DIAGNOSIS — E78.5 HYPERLIPIDEMIA, UNSPECIFIED: ICD-10-CM

## 2023-01-20 DIAGNOSIS — E87.1 HYPO-OSMOLALITY AND HYPONATREMIA: ICD-10-CM

## 2023-01-20 DIAGNOSIS — M25.561 PAIN IN RIGHT KNEE: ICD-10-CM

## 2023-01-20 DIAGNOSIS — S82.001A UNSPECIFIED FRACTURE OF RIGHT PATELLA, INITIAL ENCOUNTER FOR CLOSED FRACTURE: ICD-10-CM

## 2023-01-20 DIAGNOSIS — Z88.5 ALLERGY STATUS TO NARCOTIC AGENT: ICD-10-CM

## 2023-01-20 DIAGNOSIS — I10 ESSENTIAL (PRIMARY) HYPERTENSION: ICD-10-CM
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
## 2023-01-20 DIAGNOSIS — Z98.42 CATARACT EXTRACTION STATUS, LEFT EYE: ICD-10-CM

## 2023-01-23 ENCOUNTER — APPOINTMENT (OUTPATIENT)
Dept: ORTHOPEDIC SURGERY | Facility: CLINIC | Age: 88
End: 2023-01-23

## 2023-02-01 ENCOUNTER — APPOINTMENT (OUTPATIENT)
Dept: ORTHOPEDIC SURGERY | Facility: CLINIC | Age: 88
End: 2023-02-01
Payer: MEDICARE

## 2023-02-01 DIAGNOSIS — S82.034A NONDISPLACED TRANSVERSE FRACTURE OF RIGHT PATELLA, INITIAL ENCOUNTER FOR CLOSED FRACTURE: ICD-10-CM

## 2023-02-01 PROCEDURE — 73562 X-RAY EXAM OF KNEE 3: CPT | Mod: RT

## 2023-02-01 PROCEDURE — 99213 OFFICE O/P EST LOW 20 MIN: CPT

## 2023-02-01 NOTE — PHYSICAL EXAM
[Right] : right knee [] : light touch is intact throughout [FreeTextEntry8] :   Tenderness to palpation over the anterior aspect of the right knee. [FreeTextEntry9] :   Full extension, flexion to about 20° actively, passively to about 60°. [de-identified] :   Today she is ambulating with a walker.

## 2023-02-01 NOTE — DATA REVIEWED
[Right] : of the right [Knee] : knee [FreeTextEntry1] :   [facility] taken [facility] on [facility]23 of the right knee showed lucency in the patella suspicious for nondisplaced fracture. [FreeTextEntry2] :   X-rays taken here in the office today do show what appears to be a nondisplaced transverse fracture of the right patella.

## 2023-02-01 NOTE — HISTORY OF PRESENT ILLNESS
[de-identified] :  The patient is an 88-year-old female accompanied by her spouse and daughter here for an evaluation of her right knee.  She had a fall on 01/13/2023, landing on her knee.  She was seen and evaluated at [facility] that day and had x-rays of the right knee that was suspicious for a patellar fracture.  The patient states she was discharged to short-term rehab and was discharged home from short-term rehab.  She presents today ambulating with a walker and with a Renato brace.

## 2023-02-01 NOTE — DISCUSSION/SUMMARY
[de-identified] : I reviewed the x-rays with the patient, her spouse and daughter.  My opinion she does have a nondisplaced transverse patellar fracture.  She came in with a [place] brace.  She was placed back into the Renato brace.  It will be locked in full extension.  She can weightbear as tolerated.  I will see her back in 4 weeks for further evaluation and for repeat x-rays of the right knee.\par \par Supervising physician:  Dr. Denis

## 2023-02-16 NOTE — CDI QUERY NOTE - NSCDIOTHERTXTBX_GEN_ALL_CORE_HH
CLINICAL INDICATORS:    Consult Note Adult-Orthopedics Physician Assistant 1/13  “Subjective and Objective: 88yo female with PMHx of COPD (not on home O2) and HTN who presents with acute onset of right knee pain after sustaining a fall. Patient reports she was in the kitchen where she lost balance and fell on her right side. F/u with dr Denis for knee OA, treated with knee injections. Pt s/e at bedside, c/o pain in her right knee, chronic right knee pain and swelling > L knee at baseline. Assessment: with right patella fx s/p fall, severe OA; -pain control, icing; -knee immobilizer; -PT, wbat in knee immobilize -pt will need knee krysten brace”      XR Knee 4+ views RT 1/13-impression: Osteopenia. Lucencies through the patella, suggestive of fracture. Large knee joint effusion. Recommend CT evaluation. Severe degenerative changes of the knee. Vascular calcifications.    Medications administered: Lidocaine patch 4%, 1 transdermal daily, admin 1/14x1/17, Morphine 2mg IVP Q 4 hrs PRN for severe pain, admin 1/13-17 x 6 doses  Home medication:  mg oral tablet: 1 tab(s) orally 3 times a day, As Needed MDD:3    Based on the above clinical indicators, and your professional judgment, can the patella fracture be further clarified?    - Pathological fracture of the patella multifactorial, associated with osteopenia, severe osteoarthritis, and trauma from fall   - Pathological fracture of the patella associated with osteopenia  - Pathological fracture of the patella associated with severe osteoarthritis  - Traumatic fracture of the patella associated with fall only  - Other, please specify:  - Clinically unable to determine    Thank you,  Lisa Kaminski RN, BSN, BBA, CCDS, [facility]  [phone]

## 2023-03-01 ENCOUNTER — APPOINTMENT (OUTPATIENT)
Dept: ORTHOPEDIC SURGERY | Facility: CLINIC | Age: 88
End: 2023-03-01
Payer: MEDICARE

## 2023-03-01 DIAGNOSIS — S82.034D NONDISPLACED TRANSVERSE FRACTURE OF RIGHT PATELLA, SUBSEQUENT ENCOUNTER FOR CLOSED FRACTURE WITH ROUTINE HEALING: ICD-10-CM

## 2023-03-01 PROCEDURE — 73562 X-RAY EXAM OF KNEE 3: CPT | Mod: RT

## 2023-03-01 PROCEDURE — 99213 OFFICE O/P EST LOW 20 MIN: CPT

## 2023-03-01 NOTE — DATA REVIEWED
[Right] : of the right [Knee] : knee [FreeTextEntry1] : 3 views of the right knee were obtained in the office today and show the fracture is acceptable alignment and healing well.

## 2023-03-01 NOTE — DISCUSSION/SUMMARY
[de-identified] : I reviewed the x-ray findings with the patient and her family.  Today the Dewey brace is discontinued.  She will start formal physical therapy, Rx provided for that.  I will see her in 6 weeks for further evaluation.\par \par Supervising Physician: Dr. Denis

## 2023-03-01 NOTE — HISTORY OF PRESENT ILLNESS
[de-identified] :  The patient is an 88-year-old female accompanied by her spouse and daughter here for an evaluation of her right knee.  She had a fall on 01/13/2023, landing on her knee.  She was seen and evaluated at [facility] that day and had x-rays of the right knee that was suspicious for a patellar fracture.  She states her knee is improving.  She is little over 6 weeks out from her fracture.

## 2023-03-01 NOTE — PHYSICAL EXAM
[Right] : right knee [] : light touch is intact throughout [FreeTextEntry8] :   Tenderness to palpation over the anterior aspect of the right knee. [FreeTextEntry9] :   Full extension, flexion to about 20° actively, passively to about 60°. [de-identified] :   Today she is ambulating with a walker.

## 2023-03-06 ENCOUNTER — APPOINTMENT (OUTPATIENT)
Dept: ORTHOPEDIC SURGERY | Facility: CLINIC | Age: 88
End: 2023-03-06

## 2023-04-12 ENCOUNTER — APPOINTMENT (OUTPATIENT)
Dept: ORTHOPEDIC SURGERY | Facility: CLINIC | Age: 88
End: 2023-04-12
Payer: MEDICARE

## 2023-04-12 DIAGNOSIS — S82.035D NONDISPLACED TRANSVERSE FRACTURE OF LEFT PATELLA, SUBSEQUENT ENCOUNTER FOR CLOSED FRACTURE WITH ROUTINE HEALING: ICD-10-CM

## 2023-04-12 PROCEDURE — 99213 OFFICE O/P EST LOW 20 MIN: CPT | Mod: 25

## 2023-04-12 PROCEDURE — 73562 X-RAY EXAM OF KNEE 3: CPT | Mod: RT

## 2023-04-12 PROCEDURE — 20611 DRAIN/INJ JOINT/BURSA W/US: CPT | Mod: RT

## 2023-04-12 NOTE — PHYSICAL EXAM
[Right] : right knee [] : light touch is intact throughout [FreeTextEntry9] :   Full extension, flexion to 95 degrees. [de-identified] :   Today she is ambulating with a cane.

## 2023-04-12 NOTE — PROCEDURE
[Large Joint Injection] : Large joint injection [Right] : of the right [Knee] : knee [___ cc    1%] : Lidocaine ~Vcc of 1%  [___ cc    4mg] : Dexamethasone (Decadron) ~Vcc of 4 mg  [Risks, benefits, alternatives discussed / Verbal consent obtained] : the risks benefits, and alternatives have been discussed, and verbal consent was obtained [Visualization of the needle and placement of injection was performed without complication] : visualization of the needle and placement of injection was performed without complication

## 2023-04-12 NOTE — HISTORY OF PRESENT ILLNESS
[de-identified] :  The patient is an 88-year-old female accompanied by her spouse here for an evaluation of her right knee.  She had a fall on 01/13/2023, landing on her knee and sustaining a right patella fracture.  She is about 3 months out from her injury.  She is doing therapy at [facility] and reports that she has much more range of motion with her knee.  Her pain is markedly decreased anteriorly but she is having pain medially.

## 2023-04-12 NOTE — DISCUSSION/SUMMARY
[de-identified] : Today I recommend a cortisone injection for the right knee, the patient agreed.  The right knee was injected with 2 cc lidocaine, 1 cc dexamethasone.  Procedure note generated.  The patient will continue with formal physical therapy.  I will see her back for 6 weeks for further evaluation of her right knee.\par \par Supervising Physician: Dr. Denis

## 2023-04-12 NOTE — DATA REVIEWED
[Right] : of the right [Knee] : knee [FreeTextEntry1] : 3 views of the right knee were obtained in the office today and show the fracture is acceptable alignment completely healed.

## 2023-04-19 ENCOUNTER — APPOINTMENT (OUTPATIENT)
Dept: ORTHOPEDIC SURGERY | Facility: CLINIC | Age: 88
End: 2023-04-19
Payer: MEDICARE

## 2023-04-19 DIAGNOSIS — M25.511 PAIN IN RIGHT SHOULDER: ICD-10-CM

## 2023-04-19 DIAGNOSIS — M70.61 TROCHANTERIC BURSITIS, RIGHT HIP: ICD-10-CM

## 2023-04-19 PROCEDURE — 20610 DRAIN/INJ JOINT/BURSA W/O US: CPT | Mod: 59,RT

## 2023-04-19 PROCEDURE — 99213 OFFICE O/P EST LOW 20 MIN: CPT | Mod: 25

## 2023-04-19 PROCEDURE — 20611 DRAIN/INJ JOINT/BURSA W/US: CPT | Mod: RT

## 2023-04-19 NOTE — DISCUSSION/SUMMARY
[de-identified] : Today I recommend a cortisone injection for the right shoulder.  The patient agreed.  The shoulder was injected with 2 cc, 1 cc dexamethasone.  Procedure note generated.  Today I also recommend an injection into the trochanteric bursa.  The patient agreed.  The area was cleansed and prepped in usual sterile fashion and injected with 2 cc lidocaine, 1 cc of dexamethasone.  I will see her back in 3 months for further evaluation.\par \par Supervising Physician: Dr. Denis

## 2023-04-19 NOTE — IMAGING
[de-identified] : Physical exam of the right hip: No effusion, erythema, no ecchymosis.  Range of motion with flexion, extension, internal and external rotation of the right hip.  Tenderness to palpation over the trochanteric bursa.\par \par Physical exam right shoulder: No effusion, no erythema, no ecchymosis.  Active range of motion with forward flexion to 170 degrees, active range of motion abduction 150 degrees, active range of motion internal rotation L4.  Intact to light touch and sensation.

## 2023-04-19 NOTE — PROCEDURE
[Glenohumeral Joint] : glenohumeral joint [Glenohmeral injection] : glenohumeral injection [All ultrasound images have been permanently captured and stored accordingly in our picture archiving and communication system] : All ultrasound images have been permanently captured and stored accordingly in our picture archiving and communication system [Large Joint Injection] : Large joint injection [Right] : of the right [Greater Trochanteric Bursa] : greater trochanteric bursa [___ cc    1%] : Lidocaine ~Vcc of 1%  [___ cc    4mg] : Dexamethasone (Decadron) ~Vcc of 4 mg  [Risks, benefits, alternatives discussed / Verbal consent obtained] : the risks benefits, and alternatives have been discussed, and verbal consent was obtained

## 2023-04-19 NOTE — HISTORY OF PRESENT ILLNESS
[de-identified] : The patient is an 88-year-old female accompanied by her spouse here today for a reevaluation of her right shoulder and right hip.  She has a partial rotator cuff tear of the right shoulder and trochanteric bursitis.  She had a cortisone injection here several months ago for her shoulder and hip and did very well from those.  Her pain has since returned.

## 2023-04-27 NOTE — PROGRESS NOTE ADULT - PROVIDER SPECIALTY LIST ADULT
Spoke with patient and rescheduled EGD to 4/28/2023 at 1315 with Dr. Love. Procedure instructions reviewed with patient and questions answered. Patient denies questions at this time.    Internal Medicine

## 2023-06-21 ENCOUNTER — RX RENEWAL (OUTPATIENT)
Age: 88
End: 2023-06-21

## 2023-07-12 ENCOUNTER — APPOINTMENT (OUTPATIENT)
Dept: ORTHOPEDIC SURGERY | Facility: CLINIC | Age: 88
End: 2023-07-12
Payer: MEDICARE

## 2023-07-12 VITALS
SYSTOLIC BLOOD PRESSURE: 161 MMHG | DIASTOLIC BLOOD PRESSURE: 76 MMHG | HEART RATE: 84 BPM | BODY MASS INDEX: 17.24 KG/M2 | OXYGEN SATURATION: 94 % | WEIGHT: 101 LBS | HEIGHT: 64 IN

## 2023-07-12 DIAGNOSIS — R79.1 ABNORMAL COAGULATION PROFILE: ICD-10-CM

## 2023-07-12 DIAGNOSIS — M85.80 OTHER SPECIFIED DISORDERS OF BONE DENSITY AND STRUCTURE, UNSPECIFIED SITE: ICD-10-CM

## 2023-07-12 DIAGNOSIS — R82.90 UNSPECIFIED ABNORMAL FINDINGS IN URINE: ICD-10-CM

## 2023-07-12 DIAGNOSIS — M16.0 BILATERAL PRIMARY OSTEOARTHRITIS OF HIP: ICD-10-CM

## 2023-07-12 DIAGNOSIS — Z01.818 ENCOUNTER FOR OTHER PREPROCEDURAL EXAMINATION: ICD-10-CM

## 2023-07-12 DIAGNOSIS — E83.10 DISORDER OF IRON METABOLISM, UNSPECIFIED: ICD-10-CM

## 2023-07-12 PROCEDURE — 99215 OFFICE O/P EST HI 40 MIN: CPT

## 2023-07-12 PROCEDURE — 73503 X-RAY EXAM HIP UNI 4/> VIEWS: CPT | Mod: RT

## 2023-07-13 PROBLEM — R79.1 ABNORMAL COAGULATION PROFILE: Status: ACTIVE | Noted: 2023-07-13

## 2023-07-13 PROBLEM — Z01.818 PREOP EXAMINATION: Status: ACTIVE | Noted: 2023-07-13

## 2023-07-13 PROBLEM — R82.90 UNSPECIFIED ABNORMAL FINDINGS IN URINE: Status: ACTIVE | Noted: 2023-07-13

## 2023-07-13 PROBLEM — M85.80 OTHER SPECIFIED DISORDERS OF BONE DENSITY AND STRUCTURE, UNSPECIFIED SITE: Status: ACTIVE | Noted: 2023-07-13

## 2023-07-13 PROBLEM — E83.10 DISORDER OF IRON METABOLISM, UNSPECIFIED: Status: ACTIVE | Noted: 2023-07-13

## 2023-07-14 PROBLEM — M16.0 ARTHRITIS OF BOTH HIPS: Status: ACTIVE | Noted: 2023-07-14

## 2023-07-14 NOTE — PHYSICAL EXAM
[Acute Distress] : not in acute distress [de-identified] : General: Patient is a well-appearing female in no apparent distress. She is alert and oriented x 3. Vital signs are within normal limits.  No sign of fevers or infectious symptoms.  \par Hygiene: Excellent\par HEENT: Atraumatic with no asymmetry.  Neck motion is normal. No overt hearing deficits.\par Pulmonary: Breathing comfortably.\par Gastrointestinal: Is not obese.  \par Psych: Responding appropriately with appropriate affect. Patient does not demonstrate tangential thought, perseveration or anxiety.\par Vascular: No rashes or obvious skin abnormalities in either lower extremity. Capillary refill is <2sec. Good distal perfusion.\par Neurovascular:\par Varicose veins absent. 5/5 strength with hip flexion, knee extension, ankle dorsiflexion, ankle plantar flexion, and EHL. Sensation is intact over the lower extremity in L2-S1 nerve distributions. 2+ dorsalis pedis and posterior tibial pulses \par \par  [de-identified] : Gait: She walks with an antalgic gait but there is no Trendelenburg\par \par Inspection:\par Hip appears unremarkable.\par No lymphedema observed.\par No pitting edema observed.\par No ulcerations observed\par \par Palpation:\par No tenderness to palpation\par \par Range of Motion:		\par Right: HF: 90 IR: 10 ER: 25			\par Left: HF:90 IR: 10 ER: 25\par \par Both hips are stable no sign of dislocation or subluxation on exam\par \par Knee exam is normal bilaterally. No effusions. Good pain free full ROM bilaterally, both knees are stable to varus/valgus and ant/post stress\par \par  [de-identified] : 4 views of the right hip are reviewed.  There is severe degenerative arthritis with bone-on-bone apposition broadly.

## 2023-07-14 NOTE — HISTORY OF PRESENT ILLNESS
[de-identified] : MARIXA CRENSHAW is a pleasant 88 year female. MARIXA CRENSHAW complains of right hip pain over the past several years. Pain is located in the groin and lateral region of the hip and does not radiate. She denies prior injury or trauma. She was initially scheduled for a right hip replacement around 4 years ago but it was canceled due to anemia which has now improved. She notes the pain is worse with activity- walking/taking stairs and rates it 6-7 out of 10 but it can become more severe at times. She is having difficulty taking stairs and putting on her shoes and socks. She notes hobbling around the kitchen this weekend due to the pain. She has been treated non-surgically with Tylenol, anti-inflammatory medications, activity modification and ambulatory assistive devices: cane. The right hip pain significantly interferes with their ADLs and quality of life. Denies any fevers and chills.\par

## 2023-07-14 NOTE — DISCUSSION/SUMMARY
[de-identified] : Severe right hip arthritis.  I had a long discussion with the patient regarding hip arthritis / degenerative disease and treatment options. We reviewed the nature and etiology of degenerative hip disease.  We discussed the spectrum of symptomatic treatments. Our discussion included the use of appropriate exercises, activity modifications, weight reduction and maintenance, appropriate medication use, use of assistive devices, role of injections and avoidance of high impact activities.\par \par Given the clinical symptoms, physical exam and imaging findings, we discussed the possibility of hip replacement surgery.  We reviewed the elective quality of life nature of the procedure and the details of the surgery, approach and the implants used, using the model  in the clinic. This included discussion of the material and fixation options.  We also discussed the risks, benefits and expected and potential outcomes at length.  The details of those risks are below.\par \par Category 1 includes risks that could occur in association with any operation. They include heart attack, stroke, blood clot and pulmonary embolism, wound infection, transfusion reaction, drug allergy, and complications related to anesthesia. This list is not intended to be complete but only to convey a broad range of general medical risks to be aware of.\par \par Blood clots may lead to a block in circulation. A blood clot that completely blocks a large artery can lead to gangrene. If this happens an amputation may be required. Blood clots in leg veins lead to pain and swelling. If part of the clot breaks off it can travel to the brain and lead to a stroke. A clot can also travel to the lung, resulting in a pulmonary embolism. Medication after surgery will minimize but not eliminate these complications.\par \par Category 2 is a list of risks and complications specifically related to knee replacement. This list is not complete but is intended to make the patient aware of the kinds of implant-related risks and complications that might occur.  \par \par 1. If the device gets loose or wears out further surgery may be required to revise the prosthesis.\par 2. If an infection develops, further surgery to washout the joint, remove or replace parts, or insert an antibiotic spacer may be required\par 3. Muscle, Tendon, Nerve and blood vessel damage may result as a consequence of mobilization of the joint or dissection near these structures. These injuries can lead to weakness, numbness and paralysis. The damage may be temporary or permanent. The recovery process can be long and may require further procedures.  \par 4. Dislocations and instability may also require further surgery.  \par 5. Periprosthetic fracture requiring revision surgery.\par 6. Leg length discrepancy \par 7. Stiffness\par 8. Wound complications requiring either local wound care, revision surgery, or plastic surgery consultation \par 9. Chronic pain requiring pain management\par \par \par Patient feels strongly that based on her pain and her limitations in her activities of daily living and the impact on her quality of life that she would like surgical treatment.  Given her exam and imaging findings and symptoms I think this is reasonable approach.  Some continued symptoms related to her spine disease is possible and we discussed that.  We discussed the plan to use a hybrid or cemented total hip arthroplasty via posterior approach.  We may need a dual mobility construct to enhance stability given the spine disease as well.  We will need appropriate medical evaluations and her comorbidities do increase her risk and we discussed that.  Specifically we will need a pulmonary evaluation prior to surgery and she understands.  We will plan a home discharge and she will have support from her spouse was present for the visit today.  All questions answered.\par \par Plan: Right total hip arthroplasty\par \par Equipment: [employer] cemented Synergy stem, R3 cup, cemented polar cup on backup with dual mobility backup\par \par Evaluations: PCP; pulmonary (COPD)

## 2023-07-17 ENCOUNTER — APPOINTMENT (OUTPATIENT)
Dept: PULMONOLOGY | Facility: CLINIC | Age: 88
End: 2023-07-17
Payer: MEDICARE

## 2023-07-17 VITALS
HEART RATE: 74 BPM | OXYGEN SATURATION: 97 % | HEIGHT: 64 IN | RESPIRATION RATE: 14 BRPM | WEIGHT: 102 LBS | SYSTOLIC BLOOD PRESSURE: 162 MMHG | DIASTOLIC BLOOD PRESSURE: 86 MMHG | BODY MASS INDEX: 17.42 KG/M2

## 2023-07-17 DIAGNOSIS — Z01.818 ENCOUNTER FOR OTHER PREPROCEDURAL EXAMINATION: ICD-10-CM

## 2023-07-17 PROCEDURE — 94010 BREATHING CAPACITY TEST: CPT

## 2023-07-17 PROCEDURE — 94729 DIFFUSING CAPACITY: CPT

## 2023-07-17 PROCEDURE — 94727 GAS DIL/WSHOT DETER LNG VOL: CPT

## 2023-07-17 PROCEDURE — 99214 OFFICE O/P EST MOD 30 MIN: CPT | Mod: 25

## 2023-07-17 NOTE — HISTORY OF PRESENT ILLNESS
[TextBox_4] : She is coming for follow-up status post PFT today result reviewed moderate obstruction with improvement.\par She is on Trelegy happy with that denied cough wheezing shortness of breath also she does not need any albuterol.  She is pending surgery for her hip.\par She quit smoking more than 20 years ago.\par

## 2023-07-19 ENCOUNTER — APPOINTMENT (OUTPATIENT)
Dept: ORTHOPEDIC SURGERY | Facility: CLINIC | Age: 88
End: 2023-07-19
Payer: MEDICARE

## 2023-07-19 PROCEDURE — 20611 DRAIN/INJ JOINT/BURSA W/US: CPT | Mod: RT

## 2023-07-19 PROCEDURE — 99213 OFFICE O/P EST LOW 20 MIN: CPT | Mod: 25

## 2023-07-19 RX ORDER — MULTIVIT-MIN/IRON/FA/VIT K/LUT 4MG-200MCG
TABLET ORAL
Refills: 0 | Status: ACTIVE | COMMUNITY

## 2023-07-19 NOTE — DISCUSSION/SUMMARY
[de-identified] : Today I recommend a cortisone injection for the right knee, the patient agreed.  The right knee was injected with 2 cc lidocaine, 1 cc dexamethasone.  Procedure note generated.  I will see her back in 3 months for further evaluation.

## 2023-07-19 NOTE — IMAGING
[de-identified] : Physical exam of the right knee: No effusion, no erythema, no ecchymosis.  Full extension, flexion to 125 degrees.  Mild medial joint line tenderness to palpation, no lateral joint line tenderness to palpation.  No tenderness to palpation over the collateral units.  Intact to light touch and sensation, nonantalgic gait.

## 2023-07-19 NOTE — HISTORY OF PRESENT ILLNESS
[de-identified] : The patient is an 88-year-old female here for a subsequent reevaluation of her right knee.  She is status post right patella fracture and has a history of right knee osteoarthritis.  She points medially as to where her pain is.

## 2023-07-26 ENCOUNTER — RESULT REVIEW (OUTPATIENT)
Age: 88
End: 2023-07-26

## 2023-07-26 ENCOUNTER — OUTPATIENT (OUTPATIENT)
Dept: OUTPATIENT SERVICES | Facility: HOSPITAL | Age: 88
LOS: 1 days | End: 2023-07-26
Payer: MEDICARE

## 2023-07-26 DIAGNOSIS — J44.9 CHRONIC OBSTRUCTIVE PULMONARY DISEASE, UNSPECIFIED: ICD-10-CM

## 2023-07-26 DIAGNOSIS — Z98.49 CATARACT EXTRACTION STATUS, UNSPECIFIED EYE: Chronic | ICD-10-CM

## 2023-07-26 DIAGNOSIS — Z98.1 ARTHRODESIS STATUS: Chronic | ICD-10-CM

## 2023-07-26 PROCEDURE — 71046 X-RAY EXAM CHEST 2 VIEWS: CPT

## 2023-07-26 PROCEDURE — 71046 X-RAY EXAM CHEST 2 VIEWS: CPT | Mod: 26

## 2023-07-27 DIAGNOSIS — J44.9 CHRONIC OBSTRUCTIVE PULMONARY DISEASE, UNSPECIFIED: ICD-10-CM

## 2023-07-28 PROBLEM — R91.1 LUNG NODULE: Status: ACTIVE | Noted: 2022-02-17

## 2023-07-28 PROBLEM — Z87.891 FORMER SMOKER: Status: ACTIVE | Noted: 2020-10-07

## 2023-07-28 PROBLEM — I65.23 ARTERIOSCLEROSIS OF BOTH CAROTID ARTERIES: Status: ACTIVE | Noted: 2020-10-07

## 2023-07-31 ENCOUNTER — APPOINTMENT (OUTPATIENT)
Dept: CARDIOLOGY | Facility: CLINIC | Age: 88
End: 2023-07-31
Payer: MEDICARE

## 2023-07-31 VITALS
WEIGHT: 105 LBS | DIASTOLIC BLOOD PRESSURE: 70 MMHG | HEIGHT: 64 IN | BODY MASS INDEX: 17.93 KG/M2 | SYSTOLIC BLOOD PRESSURE: 120 MMHG

## 2023-07-31 DIAGNOSIS — Z82.49 FAMILY HISTORY OF ISCHEMIC HEART DISEASE AND OTHER DISEASES OF THE CIRCULATORY SYSTEM: ICD-10-CM

## 2023-07-31 DIAGNOSIS — R91.1 SOLITARY PULMONARY NODULE: ICD-10-CM

## 2023-07-31 DIAGNOSIS — Z87.09 PERSONAL HISTORY OF OTHER DISEASES OF THE RESPIRATORY SYSTEM: ICD-10-CM

## 2023-07-31 DIAGNOSIS — Z87.39 PERSONAL HISTORY OF OTHER DISEASES OF THE MUSCULOSKELETAL SYSTEM AND CONNECTIVE TISSUE: ICD-10-CM

## 2023-07-31 DIAGNOSIS — I65.23 OCCLUSION AND STENOSIS OF BILATERAL CAROTID ARTERIES: ICD-10-CM

## 2023-07-31 DIAGNOSIS — Z87.891 PERSONAL HISTORY OF NICOTINE DEPENDENCE: ICD-10-CM

## 2023-07-31 DIAGNOSIS — Z01.810 ENCOUNTER FOR PREPROCEDURAL CARDIOVASCULAR EXAMINATION: ICD-10-CM

## 2023-07-31 PROCEDURE — 99204 OFFICE O/P NEW MOD 45 MIN: CPT

## 2023-07-31 PROCEDURE — 93000 ELECTROCARDIOGRAM COMPLETE: CPT | Mod: NC

## 2023-07-31 RX ORDER — MIRTAZAPINE 7.5 MG/1
7.5 TABLET, FILM COATED ORAL
Qty: 90 | Refills: 0 | Status: DISCONTINUED | COMMUNITY
Start: 2022-07-06 | End: 2023-07-31

## 2023-07-31 RX ORDER — TAMSULOSIN HYDROCHLORIDE 0.4 MG/1
0.4 CAPSULE ORAL
Qty: 30 | Refills: 0 | Status: DISCONTINUED | COMMUNITY
Start: 2022-04-26 | End: 2023-07-31

## 2023-07-31 RX ORDER — SILVER SULFADIAZINE 10 MG/G
1 CREAM TOPICAL
Qty: 400 | Refills: 0 | Status: DISCONTINUED | COMMUNITY
Start: 2022-03-15 | End: 2023-07-31

## 2023-07-31 RX ORDER — BUDESONIDE AND FORMOTEROL FUMARATE DIHYDRATE 160; 4.5 UG/1; UG/1
160-4.5 AEROSOL RESPIRATORY (INHALATION) TWICE DAILY
Qty: 1 | Refills: 1 | Status: DISCONTINUED | COMMUNITY
End: 2023-07-31

## 2023-07-31 RX ORDER — PREDNISONE 10 MG
TABLET ORAL
Refills: 0 | Status: DISCONTINUED | COMMUNITY
End: 2023-07-31

## 2023-07-31 RX ORDER — SIMVASTATIN 20 MG/1
20 TABLET, FILM COATED ORAL
Refills: 0 | Status: DISCONTINUED | COMMUNITY
End: 2023-07-31

## 2023-07-31 NOTE — PHYSICAL EXAM
[Well Developed] : well developed [Well Nourished] : well nourished [No Acute Distress] : no acute distress [Normal Conjunctiva] : normal conjunctiva [Normal Venous Pressure] : normal venous pressure [No Carotid Bruit] : no carotid bruit [5th Left ICS - MCL] : palpated at the 5th LICS in the midclavicular line [Normal] : normal [No Precordial Heave] : no precordial heave was noted [Normal Rate] : normal [Rhythm Regular] : regular [Normal S1] : normal S1 [Normal S2] : normal S2 [No Murmur] : no murmurs heard [No Pitting Edema] : no pitting edema present [2+] : left 2+ [Clear Lung Fields] : clear lung fields [Good Air Entry] : good air entry [No Respiratory Distress] : no respiratory distress  [Soft] : abdomen soft [Non Tender] : non-tender [No Masses/organomegaly] : no masses/organomegaly [Normal Bowel Sounds] : normal bowel sounds [Normal Gait] : normal gait [No Edema] : no edema [No Cyanosis] : no cyanosis [No Clubbing] : no clubbing [No Varicosities] : no varicosities [No Rash] : no rash [No Skin Lesions] : no skin lesions [Moves all extremities] : moves all extremities [No Focal Deficits] : no focal deficits [Normal Speech] : normal speech [Alert and Oriented] : alert and oriented [Normal memory] : normal memory [___ +] : bilateral [unfilled]U+ pitting edema to the ankles

## 2023-08-08 ENCOUNTER — APPOINTMENT (OUTPATIENT)
Dept: CV DIAGNOSITCS | Facility: HOSPITAL | Age: 88
End: 2023-08-08
Payer: MEDICARE

## 2023-08-08 ENCOUNTER — OUTPATIENT (OUTPATIENT)
Dept: OUTPATIENT SERVICES | Facility: HOSPITAL | Age: 88
LOS: 1 days | End: 2023-08-08
Payer: MEDICARE

## 2023-08-08 DIAGNOSIS — Z98.1 ARTHRODESIS STATUS: Chronic | ICD-10-CM

## 2023-08-08 DIAGNOSIS — I10 ESSENTIAL (PRIMARY) HYPERTENSION: ICD-10-CM

## 2023-08-08 DIAGNOSIS — Z98.49 CATARACT EXTRACTION STATUS, UNSPECIFIED EYE: Chronic | ICD-10-CM

## 2023-08-08 PROCEDURE — 93306 TTE W/DOPPLER COMPLETE: CPT | Mod: 26

## 2023-08-08 PROCEDURE — 93306 TTE W/DOPPLER COMPLETE: CPT

## 2023-08-09 DIAGNOSIS — I10 ESSENTIAL (PRIMARY) HYPERTENSION: ICD-10-CM

## 2023-08-21 NOTE — REASON FOR VISIT
[Other: ____] : [unfilled] [FreeTextEntry1] : Diagnostic Tests: --------------------- EK23-NSR. Normal EKG.  21-NSR. Normal EKG.

## 2023-08-21 NOTE — ASSESSMENT
[FreeTextEntry1] : Preprocedural cardiac risk assessment: Patient without ACS, ADHF or unstable arrhythmia. RCRI 0 with 3.9% 30 day risk of death, MI or cardiac arrest. Able to perform >4 METS.  -Patient is low-intermediate risk for intermediate risk procedure.  -Check TTE. -If no structural abnormalities, no further cardiac testing needed prior to procedure. -Continue with cardiac meds.   HTN: BP at goal per ACC/AHA 2018 guidelines -Continue on MTP 25mg PO BID and amlodipine 5mg PO daily.  -Check TTE.   HLD: Need labs -Continue with simvastatin 20mg PO daily.  -Check labs.   COPD: Well controlled -Continue with Trelegy and pulm follow up.   JELLY:  -Continue statin.   Follow up in 3 months.

## 2023-08-21 NOTE — HISTORY OF PRESENT ILLNESS
[FreeTextEntry1] : Ms. Lemus is an 89yo F with PMHx of HTN, HLD, JELLY, COPD who presents to [facility] care. Her PMD is Dr. Malina Maldonado. She needs to go for hip surgery. She is having right hip replaced 09/12/23 with Dr. Jose Logan. She denies CP, SOB, ANGEL. She is able to perform >4 METS without symptoms. She previously had stress test many years ago.

## 2023-08-21 NOTE — ADDENDUM
[FreeTextEntry1] : Patient underwent TTE on 08/08/23 which showed. EF 56% with mild MR and TR with mild pHTN. Patient still low-intermediate risk for intermediate risk procedure. Can proceed with procedure without further cardiac work up.

## 2023-09-08 RX ORDER — SENNA PLUS 8.6 MG/1
2 TABLET ORAL AT BEDTIME
Refills: 0 | Status: DISCONTINUED | OUTPATIENT
Start: 2023-09-12 | End: 2023-09-13

## 2023-09-08 RX ORDER — TRAMADOL HYDROCHLORIDE 50 MG/1
50 TABLET ORAL EVERY 6 HOURS
Refills: 0 | Status: DISCONTINUED | OUTPATIENT
Start: 2023-09-12 | End: 2023-09-13

## 2023-09-08 RX ORDER — MAGNESIUM HYDROXIDE 400 MG/1
30 TABLET, CHEWABLE ORAL DAILY
Refills: 0 | Status: DISCONTINUED | OUTPATIENT
Start: 2023-09-12 | End: 2023-09-13

## 2023-09-08 RX ORDER — ASPIRIN/CALCIUM CARB/MAGNESIUM 324 MG
81 TABLET ORAL
Refills: 0 | Status: DISCONTINUED | OUTPATIENT
Start: 2023-09-13 | End: 2023-09-13

## 2023-09-08 RX ORDER — ACETAMINOPHEN 500 MG
1000 TABLET ORAL EVERY 8 HOURS
Refills: 0 | Status: DISCONTINUED | OUTPATIENT
Start: 2023-09-12 | End: 2023-09-13

## 2023-09-08 RX ORDER — TRAMADOL HYDROCHLORIDE 50 MG/1
25 TABLET ORAL EVERY 6 HOURS
Refills: 0 | Status: DISCONTINUED | OUTPATIENT
Start: 2023-09-12 | End: 2023-09-13

## 2023-09-08 RX ORDER — FAMOTIDINE 10 MG/ML
20 INJECTION INTRAVENOUS DAILY
Refills: 0 | Status: DISCONTINUED | OUTPATIENT
Start: 2023-09-12 | End: 2023-09-13

## 2023-09-08 RX ORDER — CELECOXIB 200 MG/1
100 CAPSULE ORAL EVERY 12 HOURS
Refills: 0 | Status: DISCONTINUED | OUTPATIENT
Start: 2023-09-12 | End: 2023-09-13

## 2023-09-08 RX ORDER — ONDANSETRON 8 MG/1
4 TABLET, FILM COATED ORAL EVERY 6 HOURS
Refills: 0 | Status: DISCONTINUED | OUTPATIENT
Start: 2023-09-12 | End: 2023-09-13

## 2023-09-08 RX ORDER — POLYETHYLENE GLYCOL 3350 17 G/17G
17 POWDER, FOR SOLUTION ORAL AT BEDTIME
Refills: 0 | Status: DISCONTINUED | OUTPATIENT
Start: 2023-09-12 | End: 2023-09-13

## 2023-09-11 ENCOUNTER — RESULT REVIEW (OUTPATIENT)
Age: 88
End: 2023-09-11

## 2023-09-11 ENCOUNTER — OUTPATIENT (OUTPATIENT)
Dept: OUTPATIENT SERVICES | Facility: HOSPITAL | Age: 88
LOS: 1 days | End: 2023-09-11
Payer: MEDICARE

## 2023-09-11 ENCOUNTER — TRANSCRIPTION ENCOUNTER (OUTPATIENT)
Age: 88
End: 2023-09-11

## 2023-09-11 DIAGNOSIS — Z98.1 ARTHRODESIS STATUS: Chronic | ICD-10-CM

## 2023-09-11 DIAGNOSIS — Z22.322 CARRIER OR SUSPECTED CARRIER OF METHICILLIN RESISTANT STAPHYLOCOCCUS AUREUS: ICD-10-CM

## 2023-09-11 DIAGNOSIS — Z01.818 ENCOUNTER FOR OTHER PREPROCEDURAL EXAMINATION: ICD-10-CM

## 2023-09-11 DIAGNOSIS — Z98.49 CATARACT EXTRACTION STATUS, UNSPECIFIED EYE: Chronic | ICD-10-CM

## 2023-09-11 LAB — APTT BLD: 32.7 SEC — SIGNIFICANT CHANGE UP (ref 24.5–35.6)

## 2023-09-11 PROCEDURE — 72190 X-RAY EXAM OF PELVIS: CPT | Mod: 26

## 2023-09-11 PROCEDURE — 93010 ELECTROCARDIOGRAM REPORT: CPT

## 2023-09-11 PROCEDURE — 77073 BONE LENGTH STUDIES: CPT | Mod: 26

## 2023-09-11 RX ORDER — PREGABALIN 225 MG/1
1 CAPSULE ORAL
Qty: 0 | Refills: 0 | DISCHARGE

## 2023-09-11 RX ORDER — CHOLECALCIFEROL (VITAMIN D3) 125 MCG
1 CAPSULE ORAL
Qty: 0 | Refills: 0 | DISCHARGE

## 2023-09-11 RX ORDER — POVIDONE-IODINE 5 %
1 AEROSOL (ML) TOPICAL ONCE
Refills: 0 | Status: DISCONTINUED | OUTPATIENT
Start: 2023-09-12 | End: 2023-09-13

## 2023-09-11 RX ORDER — METOPROLOL TARTRATE 50 MG
1 TABLET ORAL
Qty: 0 | Refills: 0 | DISCHARGE

## 2023-09-11 RX ORDER — FLUTICASONE FUROATE, UMECLIDINIUM BROMIDE AND VILANTEROL TRIFENATATE 200; 62.5; 25 UG/1; UG/1; UG/1
1 POWDER RESPIRATORY (INHALATION)
Qty: 0 | Refills: 0 | DISCHARGE

## 2023-09-11 RX ORDER — CHLORHEXIDINE GLUCONATE 213 G/1000ML
1 SOLUTION TOPICAL ONCE
Refills: 0 | Status: DISCONTINUED | OUTPATIENT
Start: 2023-09-12 | End: 2023-09-13

## 2023-09-11 NOTE — H&P ADULT - PROBLEM SELECTOR PLAN 1
Admit to Orthopaedic Service.  Presents today for elective right total hip replacement  Pt medically stable and cleared for procedure today by Dr. Alvarez, Dr. Wright, Dr. Ruelas

## 2023-09-11 NOTE — H&P ADULT - NSICDXPASTSURGICALHX_GEN_ALL_CORE_FT
PAST SURGICAL HISTORY:  H/O cataract extraction bl eyes    History of fusion of cervical spine      PAST SURGICAL HISTORY:  Cervical vertebral fusion     H/O cataract extraction bl eyes    H/O tubal ligation     History of fusion of cervical spine

## 2023-09-11 NOTE — H&P ADULT - NSHPPHYSICALEXAM_GEN_ALL_CORE
MSK: Decreased right hip ROM secondary to pain    Rest of PE per MD clearance Gen: Alert and oriented, NAD  MSK: Decreased right hip ROM secondary to pain  No evidence of deformity or open wound  2+ DP pulses palpable bilaterally, skin wwp, cap refill brisk   SILT to bilateral lower extremities  EHL/FHL/TA/GS 5/5 bilaterally    Rest of PE per MD clearance

## 2023-09-11 NOTE — ASU PATIENT PROFILE, ADULT - FALL HARM RISK - UNIVERSAL INTERVENTIONS
Bed in lowest position, wheels locked, appropriate side rails in place/Call bell, personal items and telephone in reach/Instruct patient to call for assistance before getting out of bed or chair/Non-slip footwear when patient is out of bed/[place] to call system/Physically safe environment - no spills, clutter or unnecessary equipment/Purposeful Proactive Rounding/Room/bathroom lighting operational, light cord in reach

## 2023-09-11 NOTE — ASU PATIENT PROFILE, ADULT - NSICDXPASTSURGICALHX_GEN_ALL_CORE_FT
PAST SURGICAL HISTORY:  Cervical vertebral fusion     H/O cataract extraction bl eyes    H/O tubal ligation     History of fusion of cervical spine

## 2023-09-11 NOTE — H&P ADULT - HISTORY OF PRESENT ILLNESS
89yo F with right hip pain x     Presents today for elective right total hip replacement 87yo F with right hip pain x 4-5 years. She states her original surgery was postponed because of the pandemic. She takes Tramadol at home for pain with some relief of symptoms. She reports she does not tolerate oxycodone as it makes her nauseous. Pain persists despite conservative measures. Ambulates with the assistance of a cane.   Denies history of blood clots/seizures. Denies CP/SOB/N/V.     Presents today for elective right total hip replacement

## 2023-09-11 NOTE — ASU PATIENT PROFILE, ADULT - NSICDXPASTMEDICALHX_GEN_ALL_CORE_FT
PAST MEDICAL HISTORY:  COPD, mild     H/O degenerative disc disease     H/O fracture of patella LEFT    HTN (hypertension)     Hypercholesterolemia     Lung nodule     Osteoarthritis     Right shoulder pain      PAST MEDICAL HISTORY:  COPD, mild     H/O degenerative disc disease     H/O fracture of patella RIGHT- NO SURGERY    HTN (hypertension)     Hypercholesterolemia     Lung nodule NO SURGERY    Osteoarthritis     Right shoulder pain

## 2023-09-11 NOTE — H&P ADULT - NSHPLABSRESULTS_GEN_ALL_CORE
Preop CBC, BMP, PT/INR  within normal range and reviewed per medical clearance  Cr- 0.81  UA: positive for wbc, leuks, bacteria  Preop CXR suggest COPD,  reviewed per medical clearance   Preop EKG DOS  3M: DOS  PTT DOS Preop CBC, PT/INR  within normal range and reviewed per medical clearance  Cr- 0.81  Na - 130, repeat Na   UA: positive for wbc, leuks, bacteria  Preop CXR suggest COPD,  reviewed per medical clearance   Preop EKG: NSR, reviewed by medical clearance.  3M: DOS  PTT DOS

## 2023-09-11 NOTE — H&P ADULT - NSICDXPASTMEDICALHX_GEN_ALL_CORE_FT
PAST MEDICAL HISTORY:  COPD, mild     H/O degenerative disc disease     HTN (hypertension)     Hypercholesterolemia      PAST MEDICAL HISTORY:  COPD, mild     H/O degenerative disc disease     H/O fracture of patella RIGHT- NO SURGERY    HTN (hypertension)     Hypercholesterolemia     Lung nodule NO SURGERY    Osteoarthritis     Right shoulder pain

## 2023-09-12 ENCOUNTER — APPOINTMENT (OUTPATIENT)
Dept: ORTHOPEDIC SURGERY | Facility: HOSPITAL | Age: 88
End: 2023-09-12

## 2023-09-12 ENCOUNTER — INPATIENT (INPATIENT)
Facility: HOSPITAL | Age: 88
LOS: 0 days | Discharge: HOME HEALTH | DRG: 470 | End: 2023-09-13
Attending: SPECIALIST | Admitting: SPECIALIST
Payer: COMMERCIAL

## 2023-09-12 ENCOUNTER — NON-APPOINTMENT (OUTPATIENT)
Age: 88
End: 2023-09-12

## 2023-09-12 ENCOUNTER — RESULT REVIEW (OUTPATIENT)
Age: 88
End: 2023-09-12

## 2023-09-12 VITALS
TEMPERATURE: 98 F | WEIGHT: 97.22 LBS | HEART RATE: 61 BPM | HEIGHT: 64 IN | RESPIRATION RATE: 16 BRPM | SYSTOLIC BLOOD PRESSURE: 134 MMHG | DIASTOLIC BLOOD PRESSURE: 64 MMHG | OXYGEN SATURATION: 98 %

## 2023-09-12 DIAGNOSIS — I10 ESSENTIAL (PRIMARY) HYPERTENSION: ICD-10-CM

## 2023-09-12 DIAGNOSIS — M43.22 FUSION OF SPINE, CERVICAL REGION: Chronic | ICD-10-CM

## 2023-09-12 DIAGNOSIS — Z98.1 ARTHRODESIS STATUS: Chronic | ICD-10-CM

## 2023-09-12 DIAGNOSIS — Z98.49 CATARACT EXTRACTION STATUS, UNSPECIFIED EYE: Chronic | ICD-10-CM

## 2023-09-12 DIAGNOSIS — M16.11 UNILATERAL PRIMARY OSTEOARTHRITIS, RIGHT HIP: ICD-10-CM

## 2023-09-12 DIAGNOSIS — E78.00 PURE HYPERCHOLESTEROLEMIA, UNSPECIFIED: ICD-10-CM

## 2023-09-12 DIAGNOSIS — Z98.51 TUBAL LIGATION STATUS: Chronic | ICD-10-CM

## 2023-09-12 DIAGNOSIS — J44.9 CHRONIC OBSTRUCTIVE PULMONARY DISEASE, UNSPECIFIED: ICD-10-CM

## 2023-09-12 LAB
APTT BLD: 34.7 SEC — SIGNIFICANT CHANGE UP (ref 24.5–35.6)
SODIUM SERPL-SCNC: 133 MMOL/L — LOW (ref 135–145)

## 2023-09-12 PROCEDURE — 72170 X-RAY EXAM OF PELVIS: CPT | Mod: 26

## 2023-09-12 PROCEDURE — 27130 TOTAL HIP ARTHROPLASTY: CPT | Mod: RT

## 2023-09-12 PROCEDURE — 88300 SURGICAL PATH GROSS: CPT | Mod: 26

## 2023-09-12 DEVICE — IMPLANTABLE DEVICE: Type: IMPLANTABLE DEVICE | Site: RIGHT | Status: FUNCTIONAL

## 2023-09-12 DEVICE — CEMENT SIMPLEX WITH TOBRAMYCIN: Type: IMPLANTABLE DEVICE | Site: RIGHT | Status: FUNCTIONAL

## 2023-09-12 DEVICE — SHELL ACET R3 STD 3 HOLE 48MM: Type: IMPLANTABLE DEVICE | Site: RIGHT | Status: FUNCTIONAL

## 2023-09-12 DEVICE — SCREW CANC SPHER 6.5X25MM: Type: IMPLANTABLE DEVICE | Site: RIGHT | Status: FUNCTIONAL

## 2023-09-12 DEVICE — CEMENT SIMPLEX P 40GM: Type: IMPLANTABLE DEVICE | Site: RIGHT | Status: FUNCTIONAL

## 2023-09-12 DEVICE — KIT PREP IM TOT HIP: Type: IMPLANTABLE DEVICE | Site: RIGHT | Status: FUNCTIONAL

## 2023-09-12 DEVICE — IMP OX FEM HD 12 X 14 22MM PLUS 0: Type: IMPLANTABLE DEVICE | Site: RIGHT | Status: FUNCTIONAL

## 2023-09-12 RX ORDER — SIMVASTATIN 20 MG/1
1 TABLET, FILM COATED ORAL
Refills: 0 | DISCHARGE

## 2023-09-12 RX ORDER — AMLODIPINE BESYLATE 2.5 MG/1
10 TABLET ORAL DAILY
Refills: 0 | Status: DISCONTINUED | OUTPATIENT
Start: 2023-09-12 | End: 2023-09-13

## 2023-09-12 RX ORDER — METOPROLOL TARTRATE 50 MG
25 TABLET ORAL DAILY
Refills: 0 | Status: DISCONTINUED | OUTPATIENT
Start: 2023-09-12 | End: 2023-09-13

## 2023-09-12 RX ORDER — KETOROLAC TROMETHAMINE 30 MG/ML
30 SYRINGE (ML) INJECTION ONCE
Refills: 0 | Status: DISCONTINUED | OUTPATIENT
Start: 2023-09-12 | End: 2023-09-12

## 2023-09-12 RX ORDER — SODIUM CHLORIDE 9 MG/ML
1000 INJECTION, SOLUTION INTRAVENOUS
Refills: 0 | Status: DISCONTINUED | OUTPATIENT
Start: 2023-09-12 | End: 2023-09-13

## 2023-09-12 RX ORDER — AMLODIPINE BESYLATE 2.5 MG/1
1 TABLET ORAL
Refills: 0 | DISCHARGE

## 2023-09-12 RX ORDER — LANOLIN ALCOHOL/MO/W.PET/CERES
5 CREAM (GRAM) TOPICAL AT BEDTIME
Refills: 0 | Status: DISCONTINUED | OUTPATIENT
Start: 2023-09-12 | End: 2023-09-13

## 2023-09-12 RX ORDER — FLUTICASONE FUROATE, UMECLIDINIUM BROMIDE AND VILANTEROL TRIFENATATE 200; 62.5; 25 UG/1; UG/1; UG/1
1 POWDER RESPIRATORY (INHALATION)
Refills: 0 | DISCHARGE

## 2023-09-12 RX ORDER — METOPROLOL TARTRATE 50 MG
1 TABLET ORAL
Refills: 0 | DISCHARGE

## 2023-09-12 RX ORDER — CELECOXIB 200 MG/1
100 CAPSULE ORAL ONCE
Refills: 0 | Status: COMPLETED | OUTPATIENT
Start: 2023-09-12 | End: 2023-09-12

## 2023-09-12 RX ORDER — SIMVASTATIN 20 MG/1
20 TABLET, FILM COATED ORAL AT BEDTIME
Refills: 0 | Status: DISCONTINUED | OUTPATIENT
Start: 2023-09-12 | End: 2023-09-13

## 2023-09-12 RX ORDER — CEFAZOLIN SODIUM 1 G
2000 VIAL (EA) INJECTION EVERY 8 HOURS
Refills: 0 | Status: COMPLETED | OUTPATIENT
Start: 2023-09-13 | End: 2023-09-13

## 2023-09-12 RX ORDER — ACETAMINOPHEN 500 MG
1000 TABLET ORAL ONCE
Refills: 0 | Status: COMPLETED | OUTPATIENT
Start: 2023-09-12 | End: 2023-09-12

## 2023-09-12 RX ADMIN — Medication 5 MILLIGRAM(S): at 23:59

## 2023-09-12 RX ADMIN — Medication 30 MILLIGRAM(S): at 20:35

## 2023-09-12 RX ADMIN — Medication 1000 MILLIGRAM(S): at 22:09

## 2023-09-12 RX ADMIN — CELECOXIB 100 MILLIGRAM(S): 200 CAPSULE ORAL at 14:55

## 2023-09-12 RX ADMIN — Medication 1000 MILLIGRAM(S): at 14:53

## 2023-09-12 RX ADMIN — Medication 30 MILLIGRAM(S): at 19:14

## 2023-09-12 RX ADMIN — Medication 100 MILLIGRAM(S): at 23:59

## 2023-09-12 RX ADMIN — Medication 1000 MILLIGRAM(S): at 21:37

## 2023-09-12 RX ADMIN — SIMVASTATIN 20 MILLIGRAM(S): 20 TABLET, FILM COATED ORAL at 21:37

## 2023-09-12 NOTE — PRE-ANESTHESIA EVALUATION ADULT - NSANTHOSAYNRD_GEN_A_CORE
No. RAKESH screening performed.  STOP BANG Legend: 0-2 = LOW Risk; 3-4 = INTERMEDIATE Risk; 5-8 = HIGH Risk

## 2023-09-12 NOTE — PROGRESS NOTE ADULT - SUBJECTIVE AND OBJECTIVE BOX
Ortho Post Op Check    Procedure: R FERNANDO  Surgeon: Desmond    Pt comfortable without complaints, pain controlled  Denies CP, SOB, N/V, numbness/tingling     Vital Signs Last 24 Hrs  T(C): 36.1 (09-12-23 @ 18:18), Max: 36.8 (09-12-23 @ 15:31)  T(F): 97 (09-12-23 @ 18:18), Max: 97 (09-12-23 @ 18:18)  HR: 66 (09-12-23 @ 18:33) (61 - 70)  BP: 126/84 (09-12-23 @ 18:33) (120/63 - 134/64)  BP(mean): 98 (09-12-23 @ 18:33) (84 - 98)  RR: 16 (09-12-23 @ 18:33) (16 - 28)  SpO2: 99% (09-12-23 @ 18:33) (96% - 99%)  I&O's Summary      Physical Exam:  General: Pt Alert and oriented, NAD  RLE:  DSG C/D/I, prineo  Pulses: 2+ dp, pt pulses, wwp, cap refill <3 sec  Sensation: SILT in sural/saph/sp/dp/tibial  distributions  Motor: EHL/FHL/TA/GS  firing      09-12    133<L>  |  x   |  x   ----------------------------<  x   x    |  x   |  x           Post-op X-Ray: Hardware in place, well aligned    A/P: 88yFemale s/p R FERNANDO with Dr. Logan  - Stable  - Pain Control  - f/u AM labs  - DVT ppx: Asa  - Post op abx: periop ancef  - PT, WBS: WBAT, post hip precautions  - Dispo: pending PT    Dm Tomlinson, PGY2  Orthopaedic Surgery  [phone]   Ortho Post Op Check    Procedure: R FERNANDO  Surgeon: Desmond    Pt comfortable without complaints, pain controlled  Denies CP, SOB, N/V, numbness/tingling     Vital Signs Last 24 Hrs  T(C): 36.1 (09-12-23 @ 18:18), Max: 36.8 (09-12-23 @ 15:31)  T(F): 97 (09-12-23 @ 18:18), Max: 97 (09-12-23 @ 18:18)  HR: 66 (09-12-23 @ 18:33) (61 - 70)  BP: 126/84 (09-12-23 @ 18:33) (120/63 - 134/64)  BP(mean): 98 (09-12-23 @ 18:33) (84 - 98)  RR: 16 (09-12-23 @ 18:33) (16 - 28)  SpO2: 99% (09-12-23 @ 18:33) (96% - 99%)  I&O's Summary      Physical Exam:  General: Pt Alert and oriented, NAD  RLE:  DSG C/D/I, prineo  Pulses: 2+ dp, pt pulses, wwp, cap refill <3 sec  Sensation: SILT in sural/saph/sp/dp/tibial  distributions  Motor: EHL/FHL/TA/GS  firing      09-12    133<L>  |  x   |  x   ----------------------------<  x   x    |  x   |  x           Post-op X-Ray: Hardware in place, well aligned    A/P: 88yFemale s/p R FERNANDO with Dr. Logan  - Stable  - Pain Control  - f/u AM labs  - DVT ppx: Asa  - Post op abx: periop ancef  - PT, WBS: WBAT  - Dispo: pending PT    Dm Tomlinson, PGY2  Orthopaedic Surgery  [phone]

## 2023-09-13 ENCOUNTER — TRANSCRIPTION ENCOUNTER (OUTPATIENT)
Age: 88
End: 2023-09-13

## 2023-09-13 VITALS
DIASTOLIC BLOOD PRESSURE: 60 MMHG | HEART RATE: 69 BPM | SYSTOLIC BLOOD PRESSURE: 115 MMHG | RESPIRATION RATE: 17 BRPM | OXYGEN SATURATION: 95 % | TEMPERATURE: 98 F

## 2023-09-13 LAB
ANION GAP SERPL CALC-SCNC: 10 MMOL/L — SIGNIFICANT CHANGE UP (ref 5–17)
BUN SERPL-MCNC: 5 MG/DL — LOW (ref 7–23)
CALCIUM SERPL-MCNC: 8.8 MG/DL — SIGNIFICANT CHANGE UP (ref 8.4–10.5)
CHLORIDE SERPL-SCNC: 96 MMOL/L — SIGNIFICANT CHANGE UP (ref 96–108)
CO2 SERPL-SCNC: 25 MMOL/L — SIGNIFICANT CHANGE UP (ref 22–31)
CREAT SERPL-MCNC: 0.49 MG/DL — LOW (ref 0.5–1.3)
EGFR: 92 ML/MIN/1.73M2 — SIGNIFICANT CHANGE UP
GLUCOSE SERPL-MCNC: 118 MG/DL — HIGH (ref 70–99)
HCT VFR BLD CALC: 39.5 % — SIGNIFICANT CHANGE UP (ref 34.5–45)
HGB BLD-MCNC: 13.8 G/DL — SIGNIFICANT CHANGE UP (ref 11.5–15.5)
MCHC RBC-ENTMCNC: 33.4 PG — SIGNIFICANT CHANGE UP (ref 27–34)
MCHC RBC-ENTMCNC: 34.9 GM/DL — SIGNIFICANT CHANGE UP (ref 32–36)
MCV RBC AUTO: 95.6 FL — SIGNIFICANT CHANGE UP (ref 80–100)
NRBC # BLD: 0 /100 WBCS — SIGNIFICANT CHANGE UP (ref 0–0)
PLATELET # BLD AUTO: 261 K/UL — SIGNIFICANT CHANGE UP (ref 150–400)
POTASSIUM SERPL-MCNC: 3.6 MMOL/L — SIGNIFICANT CHANGE UP (ref 3.5–5.3)
POTASSIUM SERPL-SCNC: 3.6 MMOL/L — SIGNIFICANT CHANGE UP (ref 3.5–5.3)
RBC # BLD: 4.13 M/UL — SIGNIFICANT CHANGE UP (ref 3.8–5.2)
RBC # FLD: 13.2 % — SIGNIFICANT CHANGE UP (ref 10.3–14.5)
SODIUM SERPL-SCNC: 131 MMOL/L — LOW (ref 135–145)
WBC # BLD: 13.35 K/UL — HIGH (ref 3.8–10.5)
WBC # FLD AUTO: 13.35 K/UL — HIGH (ref 3.8–10.5)

## 2023-09-13 PROCEDURE — 99222 1ST HOSP IP/OBS MODERATE 55: CPT

## 2023-09-13 PROCEDURE — 93005 ELECTROCARDIOGRAM TRACING: CPT

## 2023-09-13 PROCEDURE — 86900 BLOOD TYPING SEROLOGIC ABO: CPT

## 2023-09-13 PROCEDURE — 84295 ASSAY OF SERUM SODIUM: CPT

## 2023-09-13 PROCEDURE — 27130 TOTAL HIP ARTHROPLASTY: CPT

## 2023-09-13 PROCEDURE — 97161 PT EVAL LOW COMPLEX 20 MIN: CPT

## 2023-09-13 PROCEDURE — 77073 BONE LENGTH STUDIES: CPT

## 2023-09-13 PROCEDURE — 80048 BASIC METABOLIC PNL TOTAL CA: CPT

## 2023-09-13 PROCEDURE — C1889: CPT

## 2023-09-13 PROCEDURE — 85027 COMPLETE CBC AUTOMATED: CPT

## 2023-09-13 PROCEDURE — C1776: CPT

## 2023-09-13 PROCEDURE — 86850 RBC ANTIBODY SCREEN: CPT

## 2023-09-13 PROCEDURE — 72190 X-RAY EXAM OF PELVIS: CPT

## 2023-09-13 PROCEDURE — 85730 THROMBOPLASTIN TIME PARTIAL: CPT

## 2023-09-13 PROCEDURE — C1713: CPT

## 2023-09-13 PROCEDURE — 88300 SURGICAL PATH GROSS: CPT

## 2023-09-13 PROCEDURE — 86901 BLOOD TYPING SEROLOGIC RH(D): CPT

## 2023-09-13 PROCEDURE — 72170 X-RAY EXAM OF PELVIS: CPT

## 2023-09-13 RX ORDER — ASPIRIN/CALCIUM CARB/MAGNESIUM 324 MG
1 TABLET ORAL
Qty: 0 | Refills: 0 | DISCHARGE
Start: 2023-09-13

## 2023-09-13 RX ORDER — POLYETHYLENE GLYCOL 3350 17 G/17G
17 POWDER, FOR SOLUTION ORAL
Qty: 0 | Refills: 0 | DISCHARGE
Start: 2023-09-13

## 2023-09-13 RX ORDER — TIOTROPIUM BROMIDE 18 UG/1
2 CAPSULE ORAL; RESPIRATORY (INHALATION) DAILY
Refills: 0 | Status: DISCONTINUED | OUTPATIENT
Start: 2023-09-13 | End: 2023-09-13

## 2023-09-13 RX ORDER — ALBUTEROL 90 UG/1
2 AEROSOL, METERED ORAL
Qty: 0 | Refills: 0 | DISCHARGE

## 2023-09-13 RX ORDER — FAMOTIDINE 10 MG/ML
1 INJECTION INTRAVENOUS
Qty: 0 | Refills: 0 | DISCHARGE
Start: 2023-09-13

## 2023-09-13 RX ORDER — CELECOXIB 200 MG/1
1 CAPSULE ORAL
Qty: 0 | Refills: 0 | DISCHARGE
Start: 2023-09-13

## 2023-09-13 RX ORDER — AMLODIPINE BESYLATE 2.5 MG/1
5 TABLET ORAL
Refills: 0 | Status: DISCONTINUED | OUTPATIENT
Start: 2023-09-14 | End: 2023-09-13

## 2023-09-13 RX ORDER — TRAMADOL HYDROCHLORIDE 50 MG/1
0.5 TABLET ORAL
Qty: 0 | Refills: 0 | DISCHARGE
Start: 2023-09-13

## 2023-09-13 RX ORDER — BUDESONIDE AND FORMOTEROL FUMARATE DIHYDRATE 160; 4.5 UG/1; UG/1
2 AEROSOL RESPIRATORY (INHALATION)
Refills: 0 | Status: DISCONTINUED | OUTPATIENT
Start: 2023-09-13 | End: 2023-09-13

## 2023-09-13 RX ORDER — ONDANSETRON 8 MG/1
4 TABLET, FILM COATED ORAL
Qty: 0 | Refills: 0 | DISCHARGE
Start: 2023-09-13

## 2023-09-13 RX ORDER — ACETAMINOPHEN 500 MG
2 TABLET ORAL
Qty: 0 | Refills: 0 | DISCHARGE
Start: 2023-09-13

## 2023-09-13 RX ADMIN — TRAMADOL HYDROCHLORIDE 50 MILLIGRAM(S): 50 TABLET ORAL at 02:56

## 2023-09-13 RX ADMIN — TRAMADOL HYDROCHLORIDE 25 MILLIGRAM(S): 50 TABLET ORAL at 00:05

## 2023-09-13 RX ADMIN — TRAMADOL HYDROCHLORIDE 25 MILLIGRAM(S): 50 TABLET ORAL at 00:58

## 2023-09-13 RX ADMIN — AMLODIPINE BESYLATE 10 MILLIGRAM(S): 2.5 TABLET ORAL at 06:20

## 2023-09-13 RX ADMIN — Medication 81 MILLIGRAM(S): at 06:20

## 2023-09-13 RX ADMIN — CELECOXIB 100 MILLIGRAM(S): 200 CAPSULE ORAL at 06:20

## 2023-09-13 RX ADMIN — Medication 100 MILLIGRAM(S): at 07:35

## 2023-09-13 RX ADMIN — TRAMADOL HYDROCHLORIDE 50 MILLIGRAM(S): 50 TABLET ORAL at 01:56

## 2023-09-13 RX ADMIN — Medication 1000 MILLIGRAM(S): at 06:58

## 2023-09-13 RX ADMIN — Medication 25 MILLIGRAM(S): at 06:21

## 2023-09-13 RX ADMIN — Medication 1000 MILLIGRAM(S): at 06:19

## 2023-09-13 RX ADMIN — CELECOXIB 100 MILLIGRAM(S): 200 CAPSULE ORAL at 06:58

## 2023-09-13 NOTE — DISCHARGE NOTE NURSING/CASE MANAGEMENT/SOCIAL WORK - NSDCPEFALRISK_GEN_ALL_CORE
For information on Fall & Injury Prevention, visit: [email or web address] OR  [email or web address] OR  https://www.cdc.gov/steadi/patient.html

## 2023-09-13 NOTE — DISCHARGE NOTE PROVIDER - NSDCFUADDINST_GEN_ALL_CORE_FT
Please follow Dr. Logan's instruction sheets  WEIGHT BEARING:  You may bear full weight as tolerated using a walker, crutches or cane as needed. You may use  the walking aid which you were discharged with and switch to a cane whenever you feel  comfortable doing so. You should use an assistive device until you can walk comfortably  without it. Keep in mind that every patient moves at their own speed of recovery so take your  time.    DRESSING:   Please do not shower for the first 4 days after surgery. You have a waterproof mesh strip over  the main incision. You have an additional dressing near your stomach fold if your hip  replacement was done with robotic assistance. Spots of blood may be visible through the  dressing and should not alarm you but call the office if the dressing becomes saturated with  blood. Do not remove the mesh strip over the main incision until it peels off very easily in 2-3  weeks or until your first post-op visit. Remove the smaller dressing 4 days after surgery. You  can do this yourself or the visiting nurse will assist. If the incision is dry and there is no  drainage, you may shower with the dressing off 4 days after surgery. Do not scrub the  incision. Pat dry. There is no need to cover the wound with a new dressing if there is no  drainage. You should not immerse the wound in water (bath, swimming, whirlpool) for 3-4  weeks after surgery, or until the wound is completely healed with no scabs or crusts. Do not  apply any creams or ointments to your incision. If you observe drainage from the incision after  removing the dressing, please call the office.  There are no stitches or staples to be removed.    CARE OF SURGICAL SITE:  Apply ice packs to the surgical site and elevate the leg above the level of your heart when  you’re at rest to reduce pain and swelling. For icing, twenty-minute sessions followed by an  hour off is recommended. You should ice as frequently as possible. Ice should NEVER be placed  directly on the skin.    MEDICATIONS:  Nonsteroidal anti-inflammatory (NSAID) medication is prescribed to reduce pain and  inflammation, unless there is a contraindication. You've been prescribed Celebrex 100mg twice a day OR Meloxicam 7.5mg or 15mg daily. Take as prescribed, even if  your pain is mild. Do not combine Meloxicam or Celebrex with over-the-counter NSAIDs (such  as Advil or Aleve) or any other prescribed NSAIDs. If you experience stomach pain or  discoloration of your stool, stop the medication, and call your doctor.  Acetaminophen (Tylenol) is prescribed to treat mild to moderate pain and to minimize the  amount of narcotic medication required to manage severe pain. You’ve been prescribed  Acetaminophen 1,000mg every 6-8 hours for the first few weeks, even when pain is mild, as  this will reduce how often you feel the need to take narcotics. This medication is very safe at  prescribed doses. Do not exceed 4,000mg of Acetaminophen in a 24-hour period.  Narcotic (opiate)?pain medication(s) are prescribed to treat severe pain.? You've been  prescribed Tramadol 50mg to take every 6 hours as needed or Oxycodone 5mg to take every  6 hours as needed.??Take these medications only if you are experiencing pain and wean off  them rapidly if possible as they can be habit-forming.? Call the office if your pain is not well  controlled.? If you have severe pain and are running low on medication, you may request a  refill.??Refills can only be handled during regular business hours.?Remember to contact the  office by 4:00 on Friday if you believe you will need medications over the weekend.?Side effects  of opioids include nausea, respiratory depression, sedation, and constipation. Take a stool  softener (Miralax/Senna) if you experience constipation and anti-nausea medication  (Zofran/Ondansetron) if you experience nausea. Driving, operating heavy machinery and  drinking alcohol are prohibited while taking these medications.  Blood thinners are prescribed to reduce the risk of blood clots forming after surgery. You've  been prescribed Aspirin (Ecotrin) 81mg twice a day. Please take the full course as prescribed. If  you’ve been prescribed another blood thinner (Eliquis or Xarelto) due to your medical history,  please take as directed.  Antacids protect your stomach from acid irritation. You’ve been prescribed Pepcid  (Famotidine) 20mg once daily to help your stomach tolerate the combination of aspirin with an  NSAID. Please take the full course as prescribed.  You should restart all of your prescription medications once home unless specifically instructed  otherwise.  ACTIVITY INSTRUCTIONS:   You may feel very tired and it is important to rest when needed in addition to being as active as  possible. You will find that your endurance, energy levels, and ability to ambulate improve  daily.  Although guarantees against dislocation do not exist, the hip was noted to be sufficiently stable  in surgery.  Stay within a functional range of motion for all directions (flexion, extension, internal  rotation, external rotation, abduction, and adduction).  Avoid extremes of motion and uncomfortable or painful positions.  You will be provided with a Physical Therapy prescription to start outpatient PT when  appropriate and once home PT discharges you.  High impact activities such as jumping, aerobics, tennis, and skiing are not permitted during the  first 3 months after surgery. These activities can contribute to accelerated wear and should be  done with caution after this time.  Driving is not permitted until you see Dr. Logan in the office.  Sexual activity can resume after 2 weeks. Please contact the office if you need information on  how to do this safely.  Herbal supplements and vitamins may be restarted 2 weeks after surgery.  FOLLOW UP:  Follow-up in Dr. Logan's office 2-3 weeks after surgery. This appointment has been arranged  prior to surgery but if needed, call [phone] to confirm.  Please call the office at [phone] if you experience fever > 101°F, chills, pain that is  increasingly severe, redness of the wound, or unusual wound drainage. Swelling and bruising  of the leg are normal after surgery, but if swelling becomes progressively severe, please call.  If you experience chest pain or difficulty breathing, severe, painful calf swelling call 911 or go  to the nearest ER. Try to avoid going to the Emergency Room for non-emergent concerns  related to your surgical site – these are best managed by your surgeon.  Do not hesitate to call the office at [phone] with any problems or concerns.  You may also e-mail [email or web address] 24 hours a day, 7 days a week  with any problems or concerns but for emergent situations or questions, please call the  office Please follow Dr. Logan's instruction sheets. Take medications as prescribed by Dr. Logan.    WEIGHT BEARING:  You may bear full weight as tolerated using a walker, crutches or cane as needed. You may use  the walking aid which you were discharged with and switch to a cane whenever you feel  comfortable doing so. You should use an assistive device until you can walk comfortably  without it. Keep in mind that every patient moves at their own speed of recovery so take your  time.    DRESSING:   Please do not shower for the first 4 days after surgery. You have a waterproof mesh strip over  the main incision. You have an additional dressing near your stomach fold if your hip  replacement was done with robotic assistance. Spots of blood may be visible through the  dressing and should not alarm you but call the office if the dressing becomes saturated with  blood. Do not remove the mesh strip over the main incision until it peels off very easily in 2-3  weeks or until your first post-op visit. Remove the smaller dressing 4 days after surgery. You  can do this yourself or the visiting nurse will assist. If the incision is dry and there is no  drainage, you may shower with the dressing off 4 days after surgery. Do not scrub the  incision. Pat dry. There is no need to cover the wound with a new dressing if there is no  drainage. You should not immerse the wound in water (bath, swimming, whirlpool) for 3-4  weeks after surgery, or until the wound is completely healed with no scabs or crusts. Do not  apply any creams or ointments to your incision. If you observe drainage from the incision after  removing the dressing, please call the office.  There are no stitches or staples to be removed.    CARE OF SURGICAL SITE:  Apply ice packs to the surgical site and elevate the leg above the level of your heart when  you’re at rest to reduce pain and swelling. For icing, twenty-minute sessions followed by an  hour off is recommended. You should ice as frequently as possible. Ice should NEVER be placed  directly on the skin.    MEDICATIONS:  Nonsteroidal anti-inflammatory (NSAID) medication is prescribed to reduce pain and  inflammation, unless there is a contraindication. You've been prescribed Celebrex 100mg twice a day OR Meloxicam 7.5mg or 15mg daily. Take as prescribed, even if  your pain is mild. Do not combine Meloxicam or Celebrex with over-the-counter NSAIDs (such  as Advil or Aleve) or any other prescribed NSAIDs. If you experience stomach pain or  discoloration of your stool, stop the medication, and call your doctor.  Acetaminophen (Tylenol) is prescribed to treat mild to moderate pain and to minimize the  amount of narcotic medication required to manage severe pain. You’ve been prescribed  Acetaminophen 1,000mg every 6-8 hours for the first few weeks, even when pain is mild, as  this will reduce how often you feel the need to take narcotics. This medication is very safe at  prescribed doses. Do not exceed 4,000mg of Acetaminophen in a 24-hour period.  Narcotic (opiate)?pain medication(s) are prescribed to treat severe pain.? You've been  prescribed Tramadol 50mg to take every 6 hours as needed or Oxycodone 5mg to take every  6 hours as needed.??Take these medications only if you are experiencing pain and wean off  them rapidly if possible as they can be habit-forming.? Call the office if your pain is not well  controlled.? If you have severe pain and are running low on medication, you may request a  refill.??Refills can only be handled during regular business hours.?Remember to contact the  office by 4:00 on Friday if you believe you will need medications over the weekend.?Side effects  of opioids include nausea, respiratory depression, sedation, and constipation. Take a stool  softener (Miralax/Senna) if you experience constipation and anti-nausea medication  (Zofran/Ondansetron) if you experience nausea. Driving, operating heavy machinery and  drinking alcohol are prohibited while taking these medications.  Blood thinners are prescribed to reduce the risk of blood clots forming after surgery. You've  been prescribed Aspirin (Ecotrin) 81mg twice a day. Please take the full course as prescribed. If  you’ve been prescribed another blood thinner (Eliquis or Xarelto) due to your medical history,  please take as directed.  Antacids protect your stomach from acid irritation. You’ve been prescribed Pepcid  (Famotidine) 20mg once daily to help your stomach tolerate the combination of aspirin with an  NSAID. Please take the full course as prescribed.  You should restart all of your prescription medications once home unless specifically instructed  otherwise.  ACTIVITY INSTRUCTIONS:   You may feel very tired and it is important to rest when needed in addition to being as active as  possible. You will find that your endurance, energy levels, and ability to ambulate improve  daily.  Although guarantees against dislocation do not exist, the hip was noted to be sufficiently stable  in surgery.  Stay within a functional range of motion for all directions (flexion, extension, internal  rotation, external rotation, abduction, and adduction).  Avoid extremes of motion and uncomfortable or painful positions.  You will be provided with a Physical Therapy prescription to start outpatient PT when  appropriate and once home PT discharges you.  High impact activities such as jumping, aerobics, tennis, and skiing are not permitted during the  first 3 months after surgery. These activities can contribute to accelerated wear and should be  done with caution after this time.  Driving is not permitted until you see Dr. Logan in the office.  Sexual activity can resume after 2 weeks. Please contact the office if you need information on  how to do this safely.  Herbal supplements and vitamins may be restarted 2 weeks after surgery.  FOLLOW UP:  Follow-up in Dr. Logan's office 2-3 weeks after surgery. This appointment has been arranged  prior to surgery but if needed, call [phone] to confirm.  Please call the office at [phone] if you experience fever > 101°F, chills, pain that is  increasingly severe, redness of the wound, or unusual wound drainage. Swelling and bruising  of the leg are normal after surgery, but if swelling becomes progressively severe, please call.  If you experience chest pain or difficulty breathing, severe, painful calf swelling call 911 or go  to the nearest ER. Try to avoid going to the Emergency Room for non-emergent concerns  related to your surgical site – these are best managed by your surgeon.  Do not hesitate to call the office at [phone] with any problems or concerns.  You may also e-mail [email or web address] 24 hours a day, 7 days a week  with any problems or concerns but for emergent situations or questions, please call the  office

## 2023-09-13 NOTE — DISCHARGE NOTE PROVIDER - HOSPITAL COURSE
Admitted to Orthopedic service on 9/12  Surgery- Right total hip replacement  Anna-op Antibiotics- Ancef x 24 hrs  Pain control  DVT prophylaxis- SCDs, Aspirin 81mg oral twice a day  OOB/Physical Therapy

## 2023-09-13 NOTE — PATIENT PROFILE ADULT - FALL HARM RISK - RISK INTERVENTIONS

## 2023-09-13 NOTE — DISCHARGE NOTE PROVIDER - NSDCFUSCHEDAPPT_GEN_ALL_CORE_FT
Jose Logan  [facility]  ORTHOSURG [address]  Scheduled Appointment: 10/04/2023    [facility]  ONCORTHO [address]  Scheduled Appointment: 10/16/2023    Tanner Ruelas  [facility]  CARDIOLOGY [address]  Scheduled Appointment: 11/13/2023

## 2023-09-13 NOTE — CONSULT NOTE ADULT - ASSESSMENT
88F w HTN, HLD, COPD on RA, p/w progressive R Hip pain, here for elective R FERNANDO w Dr. Logan 9/12    #Post-op state - pain __. ppx: asa 81 bid. On bowel regimen and incentive spirometer  #R Hip OA  pre-op TTE 56%, PASP 41 mmHg  -Scheduled tylenol 1g q8, celebrex 100 q12  -PRN:    #Leukocytosis - WBC 13.3  #Hyponatremia - 131 from 133 - baseline __    #Asthma/COPD - albuterol PRN, trelegy ellipta  #HTN - amlodipine 5 BID (?), toprol 25,   #HLD - zocor 20 daily  Also on asa 81 daily    Plan  Please stop LR d/t worsening hypoNa 131  Please restart COPD inhalers  PT  Incomplete 88F w HTN, HLD, COPD on RA, p/w progressive R Hip pain, here for elective R FERNANDO w Dr. Logan 9/12, for HPT    #Post-op state - pain controlled. ppx: asa 81 bid. On bowel regimen and incentive spirometer  #R Hip OA  pre-op TTE 56%, PASP 41 mmHg  -Scheduled tylenol 1g q8, celebrex 100 q12  -PRN:    #Leukocytosis - WBC 13.3. Likely reactive. Pt is nontoxic and afebrile.  #Hyponatremia - 131 from 133. Does not have symptoms.     #Asthma/COPD - albuterol PRN, trelegy ellipta  #HTN - amlodipine 5 daily, toprol 25,   #HLD - zocor 20 daily  Also on asa 81 daily    Plan  Encourage PO/Fluid intake  Please stop LR d/t worsening hypoNa 131  Please restart Symbicort, Spiriva while inpatient. Resume Trellegy on dc.    DISPO: For home w HPT

## 2023-09-13 NOTE — CONSULT NOTE ADULT - SUBJECTIVE AND OBJECTIVE BOX
HPI "87yo F with right hip pain x 4-5 years. She states her original surgery was postponed because of the pandemic. She takes Tramadol at home for pain with some relief of symptoms. She reports she does not tolerate oxycodone as it makes her nauseous. Pain persists despite conservative measures. Ambulates with the assistance of a cane.   Denies history of blood clots/seizures. Denies CP/SOB/N/V.     Presents today for elective right total hip replacement (11 Sep 2023 13:09)"    88F w HTN, HLD, COPD on RA, p/w progressive R Hip pain, here for elective R FERNANDO w Dr. Lgoan     #Post-op state - pain __. ppx: asa 81 bid. On bowel regimen and incentive spirometer  #R Hip OA  pre-op TTE 56%, PASP 41 mmHg  -Scheduled tylenol 1g q8, celebrex 100 q12  -PRN:    #Leukocytosis - WBC 13.3  #Hyponatremia - 131 from 133 - baseline __    #Asthma/COPD - albuterol PRN, trelegy ellipta  #HTN - amlodipine 5 BID (?), toprol 25,   #HLD - zocor 20 daily  Also on asa 81 daily    Plan  Please stop LR d/t worsening hypoNa 131  Please restart COPD inhalers  PT  Incomplete    PAST MEDICAL & SURGICAL HISTORY:  HTN (hypertension)      Hypercholesterolemia      COPD, mild      H/O degenerative disc disease      Osteoarthritis      Right shoulder pain      Lung nodule  NO SURGERY      H/O fracture of patella  RIGHT- NO SURGERY      History of fusion of cervical spine      H/O cataract extraction  bl eyes      Cervical vertebral fusion      H/O tubal ligation          Home Meds: Home Medications:  acetaminophen 500 mg oral tablet: 2 tab(s) orally every 8 hours (13 Sep 2023 09:13)  Albuterol (Eqv-ProAir HFA) 90 mcg/inh inhalation aerosol: 2 inhaled every 4 hours as needed for  shortness of breath and/or wheezing (13 Sep 2023 09:13)  aspirin 81 mg oral delayed release tablet: 1 tab(s) orally 2 times a day (13 Sep 2023 09:13)  celecoxib 100 mg oral capsule: 1 cap(s) orally every 12 hours (13 Sep 2023 09:13)  famotidine 20 mg oral tablet: 1 tab(s) orally once a day (13 Sep 2023 09:13)  Norvasc 5 mg oral tablet: 1 orally 2 times a day (12 Sep 2023 13:27)  ondansetron 4 mg oral tablet, disintegratin milligram(s) orally every 6 hours as needed for  nausea (13 Sep 2023 09:13)  polyethylene glycol 3350 oral powder for reconstitution: 17 gram(s) orally once a day (at bedtime) (13 Sep 2023 09:13)  Toprol-XL 25 mg oral tablet, extended release: 1 orally once a day (12 Sep 2023 13:27)  traMADol 50 mg oral tablet: 0.5 tab(s) orally every 6 hours as needed for  severe pain 0.5-1 tab every 6 hours as needed for moderate to severe pain (13 Sep 2023 09:13)  Trelegy Ellipta 100 mcg-62.5 mcg-25 mcg/inh inhalation powder: 1 inhaled once a day (12 Sep 2023 13:27)  Zocor 20 mg oral tablet: 1 orally once a day (12 Sep 2023 13:27)    Allergies: Allergies    No Known Allergies    Intolerances    codeine (Nausea)    Soc:   Advanced Directives: Presumed Full Code     CURRENT MEDICATIONS:   --------------------------------------------------------------------------------------  Neurologic Medications  acetaminophen     Tablet .. 1000 milliGRAM(s) Oral every 8 hours  celecoxib 100 milliGRAM(s) Oral every 12 hours  melatonin 5 milliGRAM(s) Oral at bedtime PRN Sleep  ondansetron Injectable 4 milliGRAM(s) IV Push every 6 hours PRN Nausea and/or Vomiting  traMADol 25 milliGRAM(s) Oral every 6 hours PRN Mild Pain (1 - 3)  traMADol 50 milliGRAM(s) Oral every 6 hours PRN Moderate Pain (4 - 6)    Respiratory Medications    Cardiovascular Medications  amLODIPine   Tablet 10 milliGRAM(s) Oral daily  metoprolol succinate ER 25 milliGRAM(s) Oral daily    Gastrointestinal Medications  famotidine    Tablet 20 milliGRAM(s) Oral daily  lactated ringers. 1000 milliLiter(s) IV Continuous <Continuous>  magnesium hydroxide Suspension 30 milliLiter(s) Oral daily PRN Constipation  multivitamin 1 Tablet(s) Oral daily  polyethylene glycol 3350 17 Gram(s) Oral at bedtime  senna 2 Tablet(s) Oral at bedtime    Genitourinary Medications    Hematologic/Oncologic Medications  aspirin enteric coated 81 milliGRAM(s) Oral two times a day    Antimicrobial/Immunologic Medications    Endocrine/Metabolic Medications  simvastatin 20 milliGRAM(s) Oral at bedtime    Topical/Other Medications  chlorhexidine 2% Cloths 1 Application(s) Topical once  povidone iodine 5% Nasal Swab 1 Application(s) Both Nostrils once    --------------------------------------------------------------------------------------    VITAL SIGNS, INS/OUTS (last 24 hours):  --------------------------------------------------------------------------------------  ICU Vital Signs Last 24 Hrs  T(C): 36.6 (13 Sep 2023 08:25), Max: 36.8 (12 Sep 2023 12:32)  T(F): 97.8 (13 Sep 2023 08:25), Max: 98.3 (12 Sep 2023 12:32)  HR: 69 (13 Sep 2023 08:25) (61 - 87)  BP: 115/60 (13 Sep 2023 08:25) (110/55 - 140/66)  BP(mean): 77 (12 Sep 2023 20:07) (75 - 98)  ABP: --  ABP(mean): --  RR: 17 (13 Sep 2023 08:25) (15 - 28)  SpO2: 95% (13 Sep 2023 08:25) (95% - 100%)    O2 Parameters below as of 13 Sep 2023 08:25  Patient On (Oxygen Delivery Method): room air          I&O's Summary    12 Sep 2023 07:01  -  13 Sep 2023 07:00  --------------------------------------------------------  IN: 0 mL / OUT: 2000 mL / NET: -2000 mL      --------------------------------------------------------------------------------------    EXAM:    LABS  --------------------------------------------------------------------------------------  Labs:  CAPILLARY BLOOD GLUCOSE                              13.8   13.35 )-----------( 261      ( 13 Sep 2023 05:30 )             39.5         09-13    131<L>  |  96  |  5<L>  ----------------------------<  118<H>  3.6   |  25  |  0.49<L>      Calcium: 8.8 mg/dL (23 @ 05:30)      LFTs:         Coags:     x      ----< x       ( 12 Sep 2023 13:03 )     34.7                Urinalysis Basic - ( 13 Sep 2023 05:30 )    Color: x / Appearance: x / SG: x / pH: x  Gluc: 118 mg/dL / Ketone: x  / Bili: x / Urobili: x   Blood: x / Protein: x / Nitrite: x   Leuk Esterase: x / RBC: x / WBC x   Sq Epi: x / Non Sq Epi: x / Bacteria: x          --------------------------------------------------------------------------------------    OTHER LABS    IMAGING RESULTS  ****************   HPI "89yo F with right hip pain x 4-5 years. She states her original surgery was postponed because of the pandemic. She takes Tramadol at home for pain with some relief of symptoms. She reports she does not tolerate oxycodone as it makes her nauseous. Pain persists despite conservative measures. Ambulates with the assistance of a cane.   Denies history of blood clots/seizures. Denies CP/SOB/N/V.     Presents today for elective right total hip replacement (11 Sep 2023 13:09)"    88F w HTN, HLD, COPD on RA, p/w progressive R Hip pain, here for elective R FERNANDO w Dr. Logan     Pt reports progressive R Hip pain not improved w conservative management. Today states pain is controlled - walked wo LH/dizziness, chest pain, dyspnea, or wheezing.  Eating wo N/V/Abd pain. Voiding wo difficulty. +FLatus wo BM. No fevers.     ROS: 12 point ROS reviewed and otherwise negative per HPI  FH: No DVT/PE  SH: Non smoker.    PAST MEDICAL & SURGICAL HISTORY:  HTN (hypertension)      Hypercholesterolemia      COPD, mild      H/O degenerative disc disease      Osteoarthritis      Right shoulder pain      Lung nodule  NO SURGERY      H/O fracture of patella  RIGHT- NO SURGERY      History of fusion of cervical spine      H/O cataract extraction  bl eyes      Cervical vertebral fusion      H/O tubal ligation          Home Meds: Home Medications:  acetaminophen 500 mg oral tablet: 2 tab(s) orally every 8 hours (13 Sep 2023 09:13)  Albuterol (Eqv-ProAir HFA) 90 mcg/inh inhalation aerosol: 2 inhaled every 4 hours as needed for  shortness of breath and/or wheezing (13 Sep 2023 09:13)  aspirin 81 mg oral delayed release tablet: 1 tab(s) orally 2 times a day (13 Sep 2023 09:13)  celecoxib 100 mg oral capsule: 1 cap(s) orally every 12 hours (13 Sep 2023 09:13)  famotidine 20 mg oral tablet: 1 tab(s) orally once a day (13 Sep 2023 09:13)  Norvasc 5 mg oral tablet: 1 orally 2 times a day (12 Sep 2023 13:27)  ondansetron 4 mg oral tablet, disintegratin milligram(s) orally every 6 hours as needed for  nausea (13 Sep 2023 09:13)  polyethylene glycol 3350 oral powder for reconstitution: 17 gram(s) orally once a day (at bedtime) (13 Sep 2023 09:13)  Toprol-XL 25 mg oral tablet, extended release: 1 orally once a day (12 Sep 2023 13:27)  traMADol 50 mg oral tablet: 0.5 tab(s) orally every 6 hours as needed for  severe pain 0.5-1 tab every 6 hours as needed for moderate to severe pain (13 Sep 2023 09:13)  Trelegy Ellipta 100 mcg-62.5 mcg-25 mcg/inh inhalation powder: 1 inhaled once a day (12 Sep 2023 13:27)  Zocor 20 mg oral tablet: 1 orally once a day (12 Sep 2023 13:27)    Allergies: Allergies    No Known Allergies    Intolerances    codeine (Nausea)    Soc:   Advanced Directives: Presumed Full Code     CURRENT MEDICATIONS:   --------------------------------------------------------------------------------------  Neurologic Medications  acetaminophen     Tablet .. 1000 milliGRAM(s) Oral every 8 hours  celecoxib 100 milliGRAM(s) Oral every 12 hours  melatonin 5 milliGRAM(s) Oral at bedtime PRN Sleep  ondansetron Injectable 4 milliGRAM(s) IV Push every 6 hours PRN Nausea and/or Vomiting  traMADol 25 milliGRAM(s) Oral every 6 hours PRN Mild Pain (1 - 3)  traMADol 50 milliGRAM(s) Oral every 6 hours PRN Moderate Pain (4 - 6)    Respiratory Medications    Cardiovascular Medications  amLODIPine   Tablet 10 milliGRAM(s) Oral daily  metoprolol succinate ER 25 milliGRAM(s) Oral daily    Gastrointestinal Medications  famotidine    Tablet 20 milliGRAM(s) Oral daily  lactated ringers. 1000 milliLiter(s) IV Continuous <Continuous>  magnesium hydroxide Suspension 30 milliLiter(s) Oral daily PRN Constipation  multivitamin 1 Tablet(s) Oral daily  polyethylene glycol 3350 17 Gram(s) Oral at bedtime  senna 2 Tablet(s) Oral at bedtime    Genitourinary Medications    Hematologic/Oncologic Medications  aspirin enteric coated 81 milliGRAM(s) Oral two times a day    Antimicrobial/Immunologic Medications    Endocrine/Metabolic Medications  simvastatin 20 milliGRAM(s) Oral at bedtime    Topical/Other Medications  chlorhexidine 2% Cloths 1 Application(s) Topical once  povidone iodine 5% Nasal Swab 1 Application(s) Both Nostrils once    --------------------------------------------------------------------------------------    VITAL SIGNS, INS/OUTS (last 24 hours):  --------------------------------------------------------------------------------------  ICU Vital Signs Last 24 Hrs  T(C): 36.6 (13 Sep 2023 08:25), Max: 36.8 (12 Sep 2023 12:32)  T(F): 97.8 (13 Sep 2023 08:25), Max: 98.3 (12 Sep 2023 12:32)  HR: 69 (13 Sep 2023 08:25) (61 - 87)  BP: 115/60 (13 Sep 2023 08:25) (110/55 - 140/66)  BP(mean): 77 (12 Sep 2023 20:07) (75 - 98)  ABP: --  ABP(mean): --  RR: 17 (13 Sep 2023 08:25) (15 - 28)  SpO2: 95% (13 Sep 2023 08:25) (95% - 100%)    O2 Parameters below as of 13 Sep 2023 08:25  Patient On (Oxygen Delivery Method): room air          I&O's Summary    12 Sep 2023 07:01  -  13 Sep 2023 07:00  --------------------------------------------------------  IN: 0 mL / OUT: 2000 mL / NET: -2000 mL      --------------------------------------------------------------------------------------    EXAM:  GEN: female in NAD on RA  HEENT: NC/AT, MMM  CV: RRR, nml S1S2, no murmurs  PULM: nml effort, CTAB  ABD: Soft, non-distended, NABS, non-tender  NEURO  A/O x3, moving all extremities, Sensation intact  R Hip dressing c/d/i. 5/5 in plantarflex/ext.  PSYCH: Appropriate    LABS  --------------------------------------------------------------------------------------  Labs:  CAPILLARY BLOOD GLUCOSE                              13.8   13.35 )-----------( 261      ( 13 Sep 2023 05:30 )             39.5             131<L>  |  96  |  5<L>  ----------------------------<  118<H>  3.6   |  25  |  0.49<L>      Calcium: 8.8 mg/dL (23 @ 05:30)      LFTs:         Coags:     x      ----< x       ( 12 Sep 2023 13:03 )     34.7                Urinalysis Basic - ( 13 Sep 2023 05:30 )    Color: x / Appearance: x / SG: x / pH: x  Gluc: 118 mg/dL / Ketone: x  / Bili: x / Urobili: x   Blood: x / Protein: x / Nitrite: x   Leuk Esterase: x / RBC: x / WBC x   Sq Epi: x / Non Sq Epi: x / Bacteria: x          --------------------------------------------------------------------------------------    OTHER LABS    IMAGING RESULTS  ****************

## 2023-09-13 NOTE — PROGRESS NOTE ADULT - SUBJECTIVE AND OBJECTIVE BOX
Ortho Note    Pt seen and examined. Comfortable without complaints, pain controlled  Denies CP, SOB, N/V, numbness/tingling     Vital Signs Last 24 Hrs  T(C): 36.6 (09-13-23 @ 08:25), Max: 36.6 (09-13-23 @ 08:25)  T(F): 97.8 (09-13-23 @ 08:25), Max: 97.8 (09-13-23 @ 08:25)  HR: 69 (09-13-23 @ 08:25) (68 - 69)  BP: 115/60 (09-13-23 @ 08:25) (115/60 - 130/66)  BP(mean): --  RR: 17 (09-13-23 @ 08:25) (17 - 17)  SpO2: 95% (09-13-23 @ 08:25) (95% - 96%)  AVSS    General: Pt Alert and oriented, NAD  DSG- prineo C/D/I  Pulses: +2DP, WWP feet  Sensation: SILT BLE  Motor: 5/5 EHL/FHL/TA/GS BLE                          13.8   13.35 )-----------( 261      ( 13 Sep 2023 05:30 )             39.5     09-13    131<L>  |  96  |  5<L>  ----------------------------<  118<H>  3.6   |  25  |  0.49<L>    Ca    8.8      13 Sep 2023 05:30        A/P: 88yFemale POD#1 s/p right total hip replacement  - VSS- afebril and nontoxic appearing, continue to monitor  - hyponatremia- Na 131, LR d/c'd; salt encouraged; pt states has hyponatremia at baseline; plan ot continue to follow-up with PCP for monitoring and repeat bloodwork  - Pain Control  - DVT ppx: ASA 81mg PO bid x 30 days  - PT, WBS: WBAT  - OOB for meals, I/S  - continue bowel regimen  - dispo: home with HPT pending PT clearance     Ortho Pager [phone] Ortho Note    Pt seen and examined. Comfortable without complaints, pain controlled  Denies CP, SOB, N/V, numbness/tingling     Vital Signs Last 24 Hrs  T(C): 36.6 (09-13-23 @ 08:25), Max: 36.6 (09-13-23 @ 08:25)  T(F): 97.8 (09-13-23 @ 08:25), Max: 97.8 (09-13-23 @ 08:25)  HR: 69 (09-13-23 @ 08:25) (68 - 69)  BP: 115/60 (09-13-23 @ 08:25) (115/60 - 130/66)  BP(mean): --  RR: 17 (09-13-23 @ 08:25) (17 - 17)  SpO2: 95% (09-13-23 @ 08:25) (95% - 96%)  AVSS    General: Pt Alert and oriented, NAD  DSG- prineo C/D/I  Pulses: +2DP, WWP feet  Sensation: SILT BLE  Motor: firing EHL/FHL/TA/GS BLE, motor function intact; right foot dorsiflexion mildly weaker at baseline from previous foot surgery as per patient                          13.8   13.35 )-----------( 261      ( 13 Sep 2023 05:30 )             39.5     09-13    131<L>  |  96  |  5<L>  ----------------------------<  118<H>  3.6   |  25  |  0.49<L>    Ca    8.8      13 Sep 2023 05:30        A/P: 88yFemale POD#1 s/p right total hip replacement  - VSS- afebril and nontoxic appearing, continue to monitor  - hyponatremia- Na 131, LR d/c'd; salt encouraged; pt states has hyponatremia at baseline; plan ot continue to follow-up with PCP for monitoring and repeat bloodwork  - Pain Control  - DVT ppx: ASA 81mg PO bid x 30 days  - PT, WBS: WBAT  - OOB for meals, I/S  - continue bowel regimen  - dispo: home with HPT pending PT clearance     Ortho Pager [phone]

## 2023-09-13 NOTE — PROGRESS NOTE ADULT - SUBJECTIVE AND OBJECTIVE BOX
Ortho Note    Pt comfortable without complaints, pain controlled  Denies CP, SOB, N/V, new numbness/tingling     Vital Signs Last 24 Hrs  T(C): 36.4 (09-13-23 @ 00:40), Max: 36.4 (09-13-23 @ 00:40)  T(F): 97.6 (09-13-23 @ 00:40), Max: 97.6 (09-13-23 @ 00:40)  HR: 68 (09-13-23 @ 00:40) (68 - 68)  BP: 120/60 (09-13-23 @ 00:40) (120/60 - 120/60)  BP(mean): --  RR: --  SpO2: 96% (09-13-23 @ 00:40) (96% - 96%)  I&O's Summary    12 Sep 2023 07:01  -  13 Sep 2023 05:59  --------------------------------------------------------  IN: 0 mL / OUT: 800 mL / NET: -800 mL        General: Pt Alert and oriented, NAD  DSG C/D/I- prineo R posterior hip  B/L LE: sensation intact, DP 2+, brisk cap refill, EHL/FHL/TA/GS firing bilaterally        09-12    133<L>  |  x   |  x   ----------------------------<  x   x    |  x   |  x           A/P: 88yFemale s/p R FERNANDO with Dr. Logan 9/12  - Stable  - Pain Control  - f/u AM labs  - DVT ppx: Asa  - Post op abx: periop ancef  - PT, WBS: WBAT  - Dispo: pending PT    Ortho Pager [phone]

## 2023-09-13 NOTE — PHYSICAL THERAPY INITIAL EVALUATION ADULT - GENERAL OBSERVATIONS, REHAB EVAL
Patient received semi-dahl in bed  in NAD on RA, +SCDs, +Heplock. Cleared by DIMITRIOS Marsh. Agreeable to PT.

## 2023-09-13 NOTE — PHYSICAL THERAPY INITIAL EVALUATION ADULT - ADDITIONAL COMMENTS
Prior to admission pt was independent with ambulation and functional mobility. Owns a SPC and grab bars in the bathroom. Lives with her  in a 3 story house with 14 steps to enter.

## 2023-09-13 NOTE — DISCHARGE NOTE PROVIDER - CARE PROVIDER_API CALL
Jose Logan  Orthopaedic Surgery  [address]  [place] [address]  Phone: [phone]  Fax: [phone]  Established Patient  Follow Up Time: 2 weeks

## 2023-09-13 NOTE — PHYSICAL THERAPY INITIAL EVALUATION ADULT - PERTINENT HX OF CURRENT PROBLEM, REHAB EVAL
89yo F with right hip pain x 4-5 years. She states her original surgery was postponed because of the pandemic. Now s/p elective right total hip replacement on 9/12/23.

## 2023-09-13 NOTE — DISCHARGE NOTE NURSING/CASE MANAGEMENT/SOCIAL WORK - PATIENT PORTAL LINK FT
You can access the FollowMyHealth Patient Portal offered by [facility] by registering at the following website: [email or web address]. By joining [employer]’s FollowMyHealth portal, you will also be able to view your health information using other applications (apps) compatible with our system.

## 2023-09-13 NOTE — DISCHARGE NOTE PROVIDER - NSDCMRMEDTOKEN_GEN_ALL_CORE_FT
acetaminophen 500 mg oral tablet: 2 tab(s) orally every 8 hours  Albuterol (Eqv-ProAir HFA) 90 mcg/inh inhalation aerosol: 2 inhaled every 4 hours as needed for  shortness of breath and/or wheezing  aspirin 81 mg oral delayed release tablet: 1 tab(s) orally 2 times a day  celecoxib 100 mg oral capsule: 1 cap(s) orally every 12 hours  famotidine 20 mg oral tablet: 1 tab(s) orally once a day  Norvasc 5 mg oral tablet: 1 orally 2 times a day  ondansetron 4 mg oral tablet, disintegratin milligram(s) orally every 6 hours as needed for  nausea  polyethylene glycol 3350 oral powder for reconstitution: 17 gram(s) orally once a day (at bedtime)  Toprol-XL 25 mg oral tablet, extended release: 1 orally once a day  traMADol 50 mg oral tablet: 0.5 tab(s) orally every 6 hours as needed for  severe pain 0.5-1 tab every 6 hours as needed for moderate to severe pain  Trelegy Ellipta 100 mcg-62.5 mcg-25 mcg/inh inhalation powder: 1 inhaled once a day  Zocor 20 mg oral tablet: 1 orally once a day

## 2023-09-13 NOTE — PATIENT PROFILE ADULT - FUNCTIONAL ASSESSMENT - BASIC MOBILITY 6.
3-calculated by average/Not able to assess (calculate score using [facility] averaging method)

## 2023-09-14 ENCOUNTER — NON-APPOINTMENT (OUTPATIENT)
Age: 88
End: 2023-09-14

## 2023-09-14 ENCOUNTER — TRANSCRIPTION ENCOUNTER (OUTPATIENT)
Age: 88
End: 2023-09-14

## 2023-09-15 LAB — SURGICAL PATHOLOGY STUDY: SIGNIFICANT CHANGE UP

## 2023-09-18 ENCOUNTER — TRANSCRIPTION ENCOUNTER (OUTPATIENT)
Age: 88
End: 2023-09-18

## 2023-09-20 DIAGNOSIS — M16.11 UNILATERAL PRIMARY OSTEOARTHRITIS, RIGHT HIP: ICD-10-CM

## 2023-09-20 DIAGNOSIS — Z98.42 CATARACT EXTRACTION STATUS, LEFT EYE: ICD-10-CM

## 2023-09-20 DIAGNOSIS — J44.9 CHRONIC OBSTRUCTIVE PULMONARY DISEASE, UNSPECIFIED: ICD-10-CM

## 2023-09-20 DIAGNOSIS — E78.00 PURE HYPERCHOLESTEROLEMIA, UNSPECIFIED: ICD-10-CM

## 2023-09-20 DIAGNOSIS — Z98.41 CATARACT EXTRACTION STATUS, RIGHT EYE: ICD-10-CM

## 2023-09-20 DIAGNOSIS — D72.828 OTHER ELEVATED WHITE BLOOD CELL COUNT: ICD-10-CM

## 2023-09-20 DIAGNOSIS — Z79.82 LONG TERM (CURRENT) USE OF ASPIRIN: ICD-10-CM

## 2023-09-20 DIAGNOSIS — Z79.51 LONG TERM (CURRENT) USE OF INHALED STEROIDS: ICD-10-CM

## 2023-09-20 DIAGNOSIS — I10 ESSENTIAL (PRIMARY) HYPERTENSION: ICD-10-CM

## 2023-09-20 DIAGNOSIS — E87.1 HYPO-OSMOLALITY AND HYPONATREMIA: ICD-10-CM

## 2023-09-26 ENCOUNTER — TRANSCRIPTION ENCOUNTER (OUTPATIENT)
Age: 88
End: 2023-09-26

## 2023-10-02 ENCOUNTER — RX RENEWAL (OUTPATIENT)
Age: 88
End: 2023-10-02

## 2023-10-04 ENCOUNTER — APPOINTMENT (OUTPATIENT)
Dept: ORTHOPEDIC SURGERY | Facility: CLINIC | Age: 88
End: 2023-10-04
Payer: MEDICARE

## 2023-10-04 VITALS
BODY MASS INDEX: 17.93 KG/M2 | DIASTOLIC BLOOD PRESSURE: 76 MMHG | HEART RATE: 90 BPM | HEIGHT: 64 IN | SYSTOLIC BLOOD PRESSURE: 148 MMHG | OXYGEN SATURATION: 95 % | WEIGHT: 105 LBS

## 2023-10-04 DIAGNOSIS — R07.82 INTERCOSTAL PAIN: ICD-10-CM

## 2023-10-04 PROCEDURE — 99024 POSTOP FOLLOW-UP VISIT: CPT

## 2023-10-04 PROCEDURE — 73502 X-RAY EXAM HIP UNI 2-3 VIEWS: CPT | Mod: RT

## 2023-10-05 ENCOUNTER — NON-APPOINTMENT (OUTPATIENT)
Age: 88
End: 2023-10-05

## 2023-10-06 ENCOUNTER — TRANSCRIPTION ENCOUNTER (OUTPATIENT)
Age: 88
End: 2023-10-06

## 2023-10-12 ENCOUNTER — TRANSCRIPTION ENCOUNTER (OUTPATIENT)
Age: 88
End: 2023-10-12

## 2023-10-16 ENCOUNTER — APPOINTMENT (OUTPATIENT)
Dept: ORTHOPEDIC SURGERY | Facility: CLINIC | Age: 88
End: 2023-10-16
Payer: MEDICARE

## 2023-10-16 VITALS — BODY MASS INDEX: 17.93 KG/M2 | WEIGHT: 105 LBS | HEIGHT: 64 IN

## 2023-10-16 PROBLEM — M19.90 UNSPECIFIED OSTEOARTHRITIS, UNSPECIFIED SITE: Chronic | Status: ACTIVE | Noted: 2023-09-11

## 2023-10-16 PROBLEM — M25.511 PAIN IN RIGHT SHOULDER: Chronic | Status: ACTIVE | Noted: 2023-09-11

## 2023-10-16 PROBLEM — R91.1 SOLITARY PULMONARY NODULE: Chronic | Status: ACTIVE | Noted: 2023-09-11

## 2023-10-16 PROBLEM — Z87.81 PERSONAL HISTORY OF (HEALED) TRAUMATIC FRACTURE: Chronic | Status: ACTIVE | Noted: 2023-09-11

## 2023-10-16 PROCEDURE — 99213 OFFICE O/P EST LOW 20 MIN: CPT | Mod: 25

## 2023-10-16 PROCEDURE — 20611 DRAIN/INJ JOINT/BURSA W/US: CPT | Mod: RT

## 2023-11-13 ENCOUNTER — APPOINTMENT (OUTPATIENT)
Dept: CARDIOLOGY | Facility: CLINIC | Age: 88
End: 2023-11-13
Payer: MEDICARE

## 2023-11-13 VITALS
WEIGHT: 101 LBS | HEIGHT: 64 IN | OXYGEN SATURATION: 96 % | BODY MASS INDEX: 17.24 KG/M2 | DIASTOLIC BLOOD PRESSURE: 76 MMHG | SYSTOLIC BLOOD PRESSURE: 122 MMHG | HEART RATE: 73 BPM

## 2023-11-13 DIAGNOSIS — E78.00 PURE HYPERCHOLESTEROLEMIA, UNSPECIFIED: ICD-10-CM

## 2023-11-13 DIAGNOSIS — M16.11 UNILATERAL PRIMARY OSTEOARTHRITIS, RIGHT HIP: ICD-10-CM

## 2023-11-13 DIAGNOSIS — I10 ESSENTIAL (PRIMARY) HYPERTENSION: ICD-10-CM

## 2023-11-13 PROCEDURE — 99213 OFFICE O/P EST LOW 20 MIN: CPT

## 2023-11-13 RX ORDER — ONDANSETRON 4 MG/1
4 TABLET, ORALLY DISINTEGRATING ORAL
Qty: 16 | Refills: 0 | Status: DISCONTINUED | COMMUNITY
Start: 2023-09-01 | End: 2023-11-13

## 2023-11-13 RX ORDER — CELECOXIB 100 MG/1
100 CAPSULE ORAL
Qty: 60 | Refills: 0 | Status: DISCONTINUED | COMMUNITY
Start: 2023-09-01 | End: 2023-11-13

## 2023-11-13 RX ORDER — GUAIFENESIN 100 MG/5ML
100 LIQUID ORAL EVERY 4 HOURS
Refills: 0 | Status: DISCONTINUED | COMMUNITY
End: 2023-11-13

## 2023-11-13 RX ORDER — ASPIRIN 81 MG/1
81 TABLET, DELAYED RELEASE ORAL
Qty: 60 | Refills: 0 | Status: DISCONTINUED | COMMUNITY
Start: 2023-09-01 | End: 2023-11-13

## 2023-11-13 RX ORDER — FAMOTIDINE 20 MG/1
20 TABLET, FILM COATED ORAL
Qty: 30 | Refills: 0 | Status: DISCONTINUED | COMMUNITY
Start: 2023-09-01 | End: 2023-11-13

## 2023-11-15 ENCOUNTER — RX RENEWAL (OUTPATIENT)
Age: 88
End: 2023-11-15

## 2023-11-29 ENCOUNTER — APPOINTMENT (OUTPATIENT)
Dept: ORTHOPEDIC SURGERY | Facility: CLINIC | Age: 88
End: 2023-11-29
Payer: MEDICARE

## 2023-11-29 VITALS
BODY MASS INDEX: 17.24 KG/M2 | OXYGEN SATURATION: 94 % | WEIGHT: 101 LBS | SYSTOLIC BLOOD PRESSURE: 156 MMHG | DIASTOLIC BLOOD PRESSURE: 78 MMHG | HEIGHT: 64 IN | HEART RATE: 104 BPM

## 2023-11-29 DIAGNOSIS — M16.12 UNILATERAL PRIMARY OSTEOARTHRITIS, LEFT HIP: ICD-10-CM

## 2023-11-29 PROCEDURE — 73502 X-RAY EXAM HIP UNI 2-3 VIEWS: CPT | Mod: RT

## 2023-11-29 PROCEDURE — 99024 POSTOP FOLLOW-UP VISIT: CPT

## 2023-11-29 PROCEDURE — 73564 X-RAY EXAM KNEE 4 OR MORE: CPT | Mod: 50

## 2024-01-08 ENCOUNTER — APPOINTMENT (OUTPATIENT)
Dept: ORTHOPEDIC SURGERY | Facility: CLINIC | Age: 89
End: 2024-01-08
Payer: MEDICARE

## 2024-01-08 VITALS — HEIGHT: 64 IN | WEIGHT: 100 LBS | BODY MASS INDEX: 17.07 KG/M2

## 2024-01-08 PROCEDURE — 20611 DRAIN/INJ JOINT/BURSA W/US: CPT | Mod: RT

## 2024-01-08 PROCEDURE — 99213 OFFICE O/P EST LOW 20 MIN: CPT | Mod: 25

## 2024-01-08 NOTE — IMAGING
[de-identified] : Physical exam right knee: Mild effusion.  No erythema or ecchymosis.  Slight valgus alignment.  Tenderness to palpation over the lateral joint line, mild medial joint line tenderness to palpation.  No tenderness to palpation over the anterior aspect of the knee.  Full extension, flexion to 120 degrees.  Stable to varus and valgus stress testing.  Intact to light touch, mild antalgic gait.  Today she is ambulating with a cane.

## 2024-01-08 NOTE — HISTORY OF PRESENT ILLNESS
[de-identified] : The patient is an 88-year-old female here for a subsequent reevaluation of her right knee.  She has osteoarthritis.  She has good and bad days with the knee.  She obtained good relief from the cortisone injection administered here in September 2023, however the injection did not last that long.  She states today that she notices her leg is crooked.

## 2024-01-10 ENCOUNTER — APPOINTMENT (OUTPATIENT)
Dept: ORTHOPEDIC SURGERY | Facility: CLINIC | Age: 89
End: 2024-01-10
Payer: MEDICARE

## 2024-01-10 VITALS
HEIGHT: 64 IN | WEIGHT: 100 LBS | SYSTOLIC BLOOD PRESSURE: 128 MMHG | BODY MASS INDEX: 17.07 KG/M2 | OXYGEN SATURATION: 94 % | HEART RATE: 69 BPM | DIASTOLIC BLOOD PRESSURE: 69 MMHG

## 2024-01-10 DIAGNOSIS — M17.0 BILATERAL PRIMARY OSTEOARTHRITIS OF KNEE: ICD-10-CM

## 2024-01-10 DIAGNOSIS — Z96.641 PRESENCE OF RIGHT ARTIFICIAL HIP JOINT: ICD-10-CM

## 2024-01-10 PROCEDURE — 99213 OFFICE O/P EST LOW 20 MIN: CPT

## 2024-01-10 PROCEDURE — 73502 X-RAY EXAM HIP UNI 2-3 VIEWS: CPT | Mod: RT

## 2024-01-10 NOTE — REVIEW OF SYSTEMS
[Joint Pain] : joint pain [Joint Stiffness] : joint stiffness [Negative] : Cardiovascular [Fever] : no fever [Chills] : no chills [Lower Ext Edema] : no lower extremity edema

## 2024-01-10 NOTE — DISCUSSION/SUMMARY
[de-identified] : S/P right total hip replacement, progressing well.  Right knee arthritis.  Symptomatic and pain relief, range of motion, activity advancement and precaution strategies reviewed, including use of over-the-counter medications as needed.  We did briefly discussed knee arthroplasty but I would want further recovery from the hip and I think this would be more risky and bigger surgery for her in terms of postoperative recovery.  Did discuss potential for shoe insert to help with her alignment she does have a podiatrist and will consult regarding that.  Would like to follow-up in a year from the hip but there is any significant worsening in the interim of the hip or the knee she will not hesitate to contact us.  We provided information regarding the need for antibiotic prophylaxis for future dental or invasive procedures. We will follow up as scheduled, but advised them to return sooner if they develops any concerns for an evaluation.  All questions of the patient and her son and her  present for the visit answered.  Patient is in agreement the plan.  I, Dr. Logan, personally performed the evaluation and management (E/M) services for this patient. That E/M includes conducting the clinically appropriate initial history &/or exam, assessing all conditions, and establishing the plan of care. Today, my OXANA, Stilnest Solorzano, was here to observe my evaluation and management service for this patient & follow plan of care established by me going forward.

## 2024-01-10 NOTE — REASON FOR VISIT
[Follow-Up Visit] : a follow-up visit for [FreeTextEntry2] : s/p right hip replacement, right knee pain

## 2024-01-10 NOTE — HISTORY OF PRESENT ILLNESS
[de-identified] : MARIXA CRENSHAW is known to me for s/p right hip replacement on date of surgery: 9/12/23 and for right knee osteoarthritis Since we last saw her, she feels her right hip is doing well. She is most bothered by right knee pain and deformity. She has known OA of the right knee. She's very happy with the hip replacement results.  Denies any fevers and chills or other signs of infection.

## 2024-01-10 NOTE — PHYSICAL EXAM
[de-identified] : General: AxO x 3   Neuro: 5/5 strength with foot eversion, foot inversion, dorsiflexion, plantar flexion, sensation is intact over the lower extremity in L2-S1 nerve distributions. 2+ dorsalis pedis and posterior tibial pulses. Negative Homans sign   Skin: incision healing well, appropriate erythema, no signs of infection   Hip: Appropriate post-operative swelling. No instability appreciated through gentle ROM.  Knee: ROM: 4-115   [de-identified] : 3 views of the right hip show a hybrid hip arthroplasty with no evidence for hardware complication loosening fracture or dislocation. Previous x-rays of the knee do show severe arthritis with valgus deformity and bone-on-bone lateral compartment

## 2024-02-15 ENCOUNTER — RX RENEWAL (OUTPATIENT)
Age: 89
End: 2024-02-15

## 2024-02-20 NOTE — H&P ADULT - BIRTH SEX
Caller: Paradise Wilson    Relationship: Self    Best call back number: [phone]     Requested Prescriptions:   Requested Prescriptions     Pending Prescriptions Disp Refills    Vortioxetine HBr (TRINTELLIX) 5 MG tablet tablet 90 tablet 1     Sig: Take 1 tablet by mouth Daily With Breakfast.        Pharmacy where request should be sent: [id number] [place] [facility] [address] AT [facility] - [phone]  - [phone] FX     Last office visit with prescribing clinician: 8/30/2023   Last telemedicine visit with prescribing clinician: Visit date not found   Next office visit with prescribing clinician: 3/8/2024     Additional details provided by patient: PATIENT IS OUT OF MEDICATION    Does the patient have less than a 3 day supply:  [x] Yes  [] No        Kalen Solorio Rep   02/20/24 08:35 EST            Female

## 2024-02-21 DIAGNOSIS — R26.9 UNSPECIFIED ABNORMALITIES OF GAIT AND MOBILITY: ICD-10-CM

## 2024-04-08 NOTE — ASU PATIENT PROFILE, ADULT - NSTRANFUSIONOBJECTION_GEN_ALL_CORE_SIUH
Patient has no objection to blood transfusions. [Maximal Pain Intensity: 0/10] : 0/10 [80: Normal activity with effort; some signs or symptoms of disease.] : 80: Normal activity with effort; some signs or symptoms of disease.

## 2024-04-22 ENCOUNTER — RX RENEWAL (OUTPATIENT)
Age: 89
End: 2024-04-22

## 2024-04-23 ENCOUNTER — APPOINTMENT (OUTPATIENT)
Dept: PULMONOLOGY | Facility: CLINIC | Age: 89
End: 2024-04-23
Payer: MEDICARE

## 2024-04-23 VITALS
WEIGHT: 102 LBS | BODY MASS INDEX: 17.51 KG/M2 | HEART RATE: 97 BPM | DIASTOLIC BLOOD PRESSURE: 68 MMHG | SYSTOLIC BLOOD PRESSURE: 120 MMHG | OXYGEN SATURATION: 97 %

## 2024-04-23 DIAGNOSIS — J44.9 CHRONIC OBSTRUCTIVE PULMONARY DISEASE, UNSPECIFIED: ICD-10-CM

## 2024-04-23 PROCEDURE — 99213 OFFICE O/P EST LOW 20 MIN: CPT

## 2024-04-23 NOTE — PLAN
[TextEntry] : Will switch Trelegy to Anoro told her if not covered to check with her insurance and to let me know Albuterol as needed PFT next visit

## 2024-04-23 NOTE — HISTORY OF PRESENT ILLNESS
[TextBox_4] : Patient coming for follow-up doing great on Trelegy no cough no wheezing no shortness of breath no need for albuterol but just been told that the Trelegy is not covered anymore PFT from last year reviewed Had chest x-ray in October 2023 result reviewed Status post hip surgery did well

## 2024-04-25 RX ORDER — UMECLIDINIUM BROMIDE AND VILANTEROL TRIFENATATE 62.5; 25 UG/1; UG/1
62.5-25 POWDER RESPIRATORY (INHALATION)
Qty: 60 | Refills: 4 | Status: COMPLETED | COMMUNITY
Start: 2024-04-23 | End: 2024-04-25

## 2024-05-13 ENCOUNTER — APPOINTMENT (OUTPATIENT)
Dept: ORTHOPEDIC SURGERY | Facility: CLINIC | Age: 89
End: 2024-05-13
Payer: MEDICARE

## 2024-05-13 PROCEDURE — 99213 OFFICE O/P EST LOW 20 MIN: CPT | Mod: 25

## 2024-05-13 PROCEDURE — 20611 DRAIN/INJ JOINT/BURSA W/US: CPT | Mod: RT

## 2024-05-13 NOTE — IMAGING
[de-identified] : Physical exam right knee: No effusion, no erythema, no ecchymosis.  Full extension, flexion to 110 degrees.  Mild patellofemoral crepitus with range of motion.  Tenderness to palpation over the medial joint line.  No lateral joint line tenderness to palpation.  No tenderness to palpation over the collateral ligaments or the anterior aspect of the knee.  Intact to light touch, she ambulating with a cane today.

## 2024-05-13 NOTE — DISCUSSION/SUMMARY
[de-identified] : Today I recommend a cortisone injection for the right knee, the patient agreed.  The right knee was injected with 2 cc lidocaine, 1 cc dexamethasone.  Procedure note generated.  Please arrange for Euflexxa series injections.  Will start the Euflexxa series injections on 6/18/2024 at 11 AM.

## 2024-05-13 NOTE — HISTORY OF PRESENT ILLNESS
[de-identified] : The patient is an 89-year-old female here for a subsequent reevaluation of right knee osteoarthritis.  She is having pain in her right knee and points medially as to where the pain is.

## 2024-05-20 ENCOUNTER — APPOINTMENT (OUTPATIENT)
Dept: CARDIOLOGY | Facility: CLINIC | Age: 89
End: 2024-05-20
Payer: MEDICARE

## 2024-05-20 VITALS
WEIGHT: 99 LBS | OXYGEN SATURATION: 94 % | HEART RATE: 76 BPM | HEIGHT: 64 IN | SYSTOLIC BLOOD PRESSURE: 128 MMHG | DIASTOLIC BLOOD PRESSURE: 72 MMHG | BODY MASS INDEX: 16.9 KG/M2

## 2024-05-20 PROCEDURE — 99214 OFFICE O/P EST MOD 30 MIN: CPT

## 2024-05-20 RX ORDER — FLUTICASONE FUROATE, UMECLIDINIUM BROMIDE AND VILANTEROL TRIFENATATE 100; 62.5; 25 UG/1; UG/1; UG/1
100-62.5-25 POWDER RESPIRATORY (INHALATION)
Qty: 180 | Refills: 3 | Status: DISCONTINUED | COMMUNITY
Start: 2023-06-21 | End: 2024-05-20

## 2024-05-20 RX ORDER — SIMVASTATIN 20 MG/1
20 TABLET, FILM COATED ORAL
Qty: 90 | Refills: 3 | Status: ACTIVE | COMMUNITY

## 2024-05-20 RX ORDER — MELOXICAM 7.5 MG/1
7.5 TABLET ORAL
Qty: 30 | Refills: 3 | Status: ACTIVE | COMMUNITY
Start: 2023-11-14

## 2024-05-20 RX ORDER — ALBUTEROL SULFATE 90 UG/1
108 (90 BASE) INHALANT RESPIRATORY (INHALATION)
Qty: 1 | Refills: 0 | Status: DISCONTINUED | COMMUNITY
Start: 2022-07-05 | End: 2024-05-20

## 2024-05-20 RX ORDER — FLUTICASONE FUROATE, UMECLIDINIUM BROMIDE AND VILANTEROL TRIFENATATE 100; 62.5; 25 UG/1; UG/1; UG/1
100-62.5-25 POWDER RESPIRATORY (INHALATION)
Qty: 3 | Refills: 3 | Status: DISCONTINUED | COMMUNITY
Start: 2022-06-22 | End: 2024-05-20

## 2024-05-20 RX ORDER — IPRATROPIUM BROMIDE AND ALBUTEROL SULFATE .5; 3 MG/3ML; MG/3ML
2.5-0.5 SOLUTION RESPIRATORY (INHALATION)
Refills: 0 | Status: DISCONTINUED | COMMUNITY
End: 2024-05-20

## 2024-05-20 RX ORDER — METOPROLOL TARTRATE 25 MG/1
25 TABLET, FILM COATED ORAL
Qty: 180 | Refills: 0 | Status: ACTIVE | COMMUNITY
Start: 2022-04-26

## 2024-05-20 RX ORDER — ALBUTEROL SULFATE 90 UG/1
108 (90 BASE) INHALANT RESPIRATORY (INHALATION)
Qty: 1 | Refills: 0 | Status: DISCONTINUED | COMMUNITY
Start: 2021-12-08 | End: 2024-05-20

## 2024-05-20 RX ORDER — POLYETHYLENE GLYCOL 3350 17 G/17G
17 POWDER, FOR SOLUTION ORAL
Qty: 14 | Refills: 0 | Status: ACTIVE | COMMUNITY
Start: 2023-09-01

## 2024-05-20 RX ORDER — FLUTICASONE FUROATE, UMECLIDINIUM BROMIDE AND VILANTEROL TRIFENATATE 100; 62.5; 25 UG/1; UG/1; UG/1
100-62.5-25 POWDER RESPIRATORY (INHALATION)
Qty: 3 | Refills: 3 | Status: DISCONTINUED | COMMUNITY
Start: 1900-01-01 | End: 2024-05-20

## 2024-05-20 RX ORDER — AMLODIPINE BESYLATE 5 MG/1
5 TABLET ORAL
Refills: 0 | Status: DISCONTINUED | COMMUNITY
End: 2024-05-20

## 2024-05-20 RX ORDER — AMLODIPINE BESYLATE 5 MG/1
5 TABLET ORAL DAILY
Refills: 0 | Status: ACTIVE | COMMUNITY

## 2024-05-20 RX ORDER — ACETAMINOPHEN 500 MG/1
500 TABLET ORAL
Qty: 84 | Refills: 0 | Status: ACTIVE | COMMUNITY
Start: 2023-09-01

## 2024-05-20 RX ORDER — TRAMADOL HYDROCHLORIDE 50 MG/1
50 TABLET, COATED ORAL
Qty: 21 | Refills: 0 | Status: ACTIVE | COMMUNITY
Start: 2022-04-26

## 2024-05-20 NOTE — PHYSICAL EXAM
[Well Developed] : well developed [Well Nourished] : well nourished [No Acute Distress] : no acute distress [Normal Conjunctiva] : normal conjunctiva [Normal Venous Pressure] : normal venous pressure [No Carotid Bruit] : no carotid bruit [5th Left ICS - MCL] : palpated at the 5th LICS in the midclavicular line [Normal] : normal [No Precordial Heave] : no precordial heave was noted [Normal Rate] : normal [Rhythm Regular] : regular [Normal S1] : normal S1 [Normal S2] : normal S2 [No Murmur] : no murmurs heard [___ +] : bilateral [unfilled]U+ pitting edema to the ankles [2+] : left 2+ [Clear Lung Fields] : clear lung fields [Good Air Entry] : good air entry [No Respiratory Distress] : no respiratory distress  [Soft] : abdomen soft [Non Tender] : non-tender [No Masses/organomegaly] : no masses/organomegaly [Normal Bowel Sounds] : normal bowel sounds [Normal Gait] : normal gait [No Edema] : no edema [No Cyanosis] : no cyanosis [No Clubbing] : no clubbing [No Varicosities] : no varicosities [No Rash] : no rash [No Skin Lesions] : no skin lesions [Moves all extremities] : moves all extremities [No Focal Deficits] : no focal deficits [Normal Speech] : normal speech [Alert and Oriented] : alert and oriented [Normal memory] : normal memory

## 2024-05-20 NOTE — ASSESSMENT
[FreeTextEntry1] : HTN: BP at goal per ACC/AHA 2018 guidelines -Continue on MTP 25mg PO BID and amlodipine 5mg PO daily. -Check TTE  HLD: , TG 88, HDL 87, LDL 92 (08/23) -Continue with simvastatin 20mg PO daily.  COPD: Well controlled -Continue with Trelegy and pulm follow up.  JELLY: -Continue statin.  Follow up in 6 months. -Labs with Dr. Maldonado

## 2024-05-20 NOTE — REASON FOR VISIT
[Other: ____] : [unfilled] [FreeTextEntry1] : Diagnostic Tests: --------------------- EK24-NSR. Normal EKG.  23-NSR. Normal EKG.  21-NSR. Normal EKG.  ------------------------ Echo:  23-TTE: EF 56%. Mild MR, TR. PASP 41 mmHg.

## 2024-05-20 NOTE — HISTORY OF PRESENT ILLNESS
[FreeTextEntry1] : Ms. Lemus is an 89yo F with PMHx of HTN, HLD, JELLY, COPD who presents for follow up. Her PMD is Dr. Malina Maldonado. She needs to go for hip surgery. She is having right hip replaced 09/12/23 with Dr. Jose Logan. She denies CP, SOB, ANGEL. She is able to perform >4 METS without symptoms. She previously had stress test many years ago.  11/13/23-Patient is feeling well. She went for right hip replacement. She is having some balance issues which she is working with PT for.  05/20/24-She is feeling well. She has no new complaints today. Her  has early stage dementia and she is selling their [place] home soon.

## 2024-06-12 RX ORDER — UMECLIDINIUM BROMIDE AND VILANTEROL TRIFENATATE 62.5; 25 UG/1; UG/1
62.5-25 POWDER RESPIRATORY (INHALATION)
Qty: 3 | Refills: 3 | Status: ACTIVE | COMMUNITY
Start: 1900-01-01 | End: 1900-01-01

## 2024-06-18 ENCOUNTER — APPOINTMENT (OUTPATIENT)
Dept: ORTHOPEDIC SURGERY | Facility: CLINIC | Age: 89
End: 2024-06-18
Payer: MEDICARE

## 2024-06-18 PROCEDURE — 99213 OFFICE O/P EST LOW 20 MIN: CPT | Mod: 25

## 2024-06-18 PROCEDURE — 20611 DRAIN/INJ JOINT/BURSA W/US: CPT | Mod: RT

## 2024-06-18 NOTE — PROCEDURE
[Large Joint Injection] : Large joint injection [Right] : of the right [Knee] : knee [Alcohol] : alcohol [Euflexxa(20mg)] : 20mg of Euflexxa [#1] : series #1 [Risks, benefits, alternatives discussed / Verbal consent obtained] : the risks benefits, and alternatives have been discussed, and verbal consent was obtained [All ultrasound images have been permanently captured and stored accordingly in our picture archiving and communication system] : All ultrasound images have been permanently captured and stored accordingly in our picture archiving and communication system [Visualization of the needle and placement of injection was performed without complication] : visualization of the needle and placement of injection was performed without complication

## 2024-06-18 NOTE — IMAGING
[de-identified] : Physical exam of the right knee: No effusion, no erythema, no ecchymosis.  Full extension, flexion to 90 degrees.  Range of motion assessed with the patient sitting up on the exam table.  Intact to light touch, she is ambulating with a cane today.

## 2024-06-18 NOTE — HISTORY OF PRESENT ILLNESS
[de-identified] : The patient is an 89-year-old female here for a subsequent reevaluation of her right knee.  She has osteoarthritis.  She has pain on a daily basis, the severity waxes and wanes.

## 2024-06-18 NOTE — DISCUSSION/SUMMARY
[de-identified] : Today I recommend a Euflexxa injection for the right knee.  The patient agreed.  The right knee was injected with 2 cc of Euflexxa, procedure note generated.  This was the first injection Euflexxa for the right knee.  I will see him back in a week for further evaluation.

## 2024-06-25 ENCOUNTER — APPOINTMENT (OUTPATIENT)
Dept: ORTHOPEDIC SURGERY | Facility: CLINIC | Age: 89
End: 2024-06-25
Payer: MEDICARE

## 2024-06-25 PROCEDURE — 20611 DRAIN/INJ JOINT/BURSA W/US: CPT | Mod: RT

## 2024-07-02 ENCOUNTER — APPOINTMENT (OUTPATIENT)
Dept: ORTHOPEDIC SURGERY | Facility: CLINIC | Age: 89
End: 2024-07-02
Payer: MEDICARE

## 2024-07-02 DIAGNOSIS — M17.11 UNILATERAL PRIMARY OSTEOARTHRITIS, RIGHT KNEE: ICD-10-CM

## 2024-07-02 PROCEDURE — 20611 DRAIN/INJ JOINT/BURSA W/US: CPT | Mod: RT

## 2024-07-18 RX ORDER — FLUTICASONE FUROATE, UMECLIDINIUM BROMIDE AND VILANTEROL TRIFENATATE 100; 62.5; 25 UG/1; UG/1; UG/1
100-62.5-25 POWDER RESPIRATORY (INHALATION)
Qty: 60 | Refills: 3 | Status: ACTIVE | COMMUNITY
Start: 2024-07-18 | End: 1900-01-01

## 2024-07-25 ENCOUNTER — APPOINTMENT (OUTPATIENT)
Dept: CV DIAGNOSITCS | Facility: HOSPITAL | Age: 89
End: 2024-07-25

## 2024-08-07 ENCOUNTER — APPOINTMENT (OUTPATIENT)
Dept: ORTHOPEDIC SURGERY | Facility: CLINIC | Age: 89
End: 2024-08-07

## 2024-08-21 ENCOUNTER — APPOINTMENT (OUTPATIENT)
Dept: ORTHOPEDIC SURGERY | Facility: CLINIC | Age: 89
End: 2024-08-21
Payer: MEDICARE

## 2024-08-21 VITALS
HEIGHT: 64 IN | SYSTOLIC BLOOD PRESSURE: 120 MMHG | WEIGHT: 99 LBS | OXYGEN SATURATION: 97 % | BODY MASS INDEX: 16.9 KG/M2 | DIASTOLIC BLOOD PRESSURE: 69 MMHG | HEART RATE: 73 BPM

## 2024-08-21 DIAGNOSIS — M17.0 BILATERAL PRIMARY OSTEOARTHRITIS OF KNEE: ICD-10-CM

## 2024-08-21 DIAGNOSIS — M16.12 UNILATERAL PRIMARY OSTEOARTHRITIS, LEFT HIP: ICD-10-CM

## 2024-08-21 DIAGNOSIS — Z96.641 PRESENCE OF RIGHT ARTIFICIAL HIP JOINT: ICD-10-CM

## 2024-08-21 PROCEDURE — 73564 X-RAY EXAM KNEE 4 OR MORE: CPT | Mod: RT

## 2024-08-21 PROCEDURE — 73502 X-RAY EXAM HIP UNI 2-3 VIEWS: CPT | Mod: RT

## 2024-08-21 PROCEDURE — 99213 OFFICE O/P EST LOW 20 MIN: CPT

## 2024-08-21 NOTE — PHYSICAL EXAM
[de-identified] : General: AxO x 3 Ambulating with a short stride and gait  Neuro: 5/5 strength with foot eversion, foot inversion, dorsiflexion, plantar flexion, sensation is intact over the lower extremity in L2-S1 nerve distributions. 2+ dorsalis pedis and posterior tibial pulses.    Skin: incision well healed on the right, no signs of infection   Hip: No instability appreciated through gentle ROM.  Knee: ROM: 4-115   [de-identified] : 3 views of the right hip and 4 views of the right knee are ordered to evaluate her pain and her hip arthroplasty.  She has a hybrid hip arthroplasty with no hardware complications and fracture or dislocation.  There is severe arthritis in the knee with significant joint space loss and sclerosis.

## 2024-08-21 NOTE — HISTORY OF PRESENT ILLNESS
[de-identified] : MARIXA CRENSHAW is known to me for s/p right hip replacement on date of surgery: 9/12/23 and for right knee osteoarthritis and left hip osteoarthritis Since we last saw her, she feels her right hip is doing very well. She has some left hip pain which is manageable. She gets 3 series HA injections to the right knee every 6 months and feels they help her knee pain, although not very long lasting.  Denies any fevers and chills or other signs of infection.

## 2024-08-21 NOTE — REVIEW OF SYSTEMS
[Joint Pain] : joint pain [Joint Stiffness] : joint stiffness [Negative] : Respiratory [Fever] : no fever [Chills] : no chills

## 2024-08-21 NOTE — DISCUSSION/SUMMARY
[de-identified] : Almost 1 year S/P right total hip replacement, with severe right knee arthritis as well.  Overall she is doing well and has no issues with regards to the hip and is pleased with her surgical result outcome. Chelsea Solorzano, was here to observe my evaluation and management service for this patient & follow plan of care established by me going forward.  Symptomatic and pain relief, range of motion, activity advancement and precaution strategies reviewed, including use of over-the-counter medications as needed.  She does receive injections in the right knee from a physician locally and she plans continue that.  We did discuss the risks and benefits of that.  It does provide her some relief but is not durable.  Regarding the hip I would recommend yearly follow-up.  We provided information regarding the need for antibiotic prophylaxis for future dental or invasive procedures. We will follow up as scheduled, but advised them to return sooner if they develops any concerns for an evaluation with either the hip or her knee.  Patient is in agreement the plan.  All questions answered      I, Dr. Logan, personally performed the evaluation and management (E/M) services for this patient. That E/M includes conducting the clinically appropriate initial history &/or exam, assessing all conditions, and establishing the plan of care. Today, my OXANA,

## 2024-08-27 ENCOUNTER — RX RENEWAL (OUTPATIENT)
Age: 89
End: 2024-08-27

## 2024-10-22 ENCOUNTER — APPOINTMENT (OUTPATIENT)
Dept: PULMONOLOGY | Facility: CLINIC | Age: 89
End: 2024-10-22
Payer: MEDICARE

## 2024-10-22 VITALS
SYSTOLIC BLOOD PRESSURE: 124 MMHG | BODY MASS INDEX: 17.24 KG/M2 | DIASTOLIC BLOOD PRESSURE: 80 MMHG | OXYGEN SATURATION: 95 % | HEART RATE: 95 BPM | WEIGHT: 101 LBS | HEIGHT: 64 IN

## 2024-10-22 DIAGNOSIS — J44.9 CHRONIC OBSTRUCTIVE PULMONARY DISEASE, UNSPECIFIED: ICD-10-CM

## 2024-10-22 PROCEDURE — 99214 OFFICE O/P EST MOD 30 MIN: CPT

## 2024-10-22 PROCEDURE — G2211 COMPLEX E/M VISIT ADD ON: CPT

## 2024-10-22 RX ORDER — FLUTICASONE FUROATE, UMECLIDINIUM BROMIDE AND VILANTEROL TRIFENATATE 100; 62.5; 25 UG/1; UG/1; UG/1
100-62.5-25 POWDER RESPIRATORY (INHALATION)
Qty: 60 | Refills: 2 | Status: ACTIVE | COMMUNITY
Start: 2024-10-22 | End: 1900-01-01

## 2024-10-28 ENCOUNTER — RESULT REVIEW (OUTPATIENT)
Age: 89
End: 2024-10-28

## 2024-10-28 ENCOUNTER — APPOINTMENT (OUTPATIENT)
Dept: PULMONOLOGY | Facility: HOSPITAL | Age: 89
End: 2024-10-28
Payer: MEDICARE

## 2024-10-28 ENCOUNTER — OUTPATIENT (OUTPATIENT)
Dept: OUTPATIENT SERVICES | Facility: HOSPITAL | Age: 89
LOS: 1 days | End: 2024-10-28
Payer: MEDICARE

## 2024-10-28 DIAGNOSIS — Z98.1 ARTHRODESIS STATUS: Chronic | ICD-10-CM

## 2024-10-28 DIAGNOSIS — M43.22 FUSION OF SPINE, CERVICAL REGION: Chronic | ICD-10-CM

## 2024-10-28 DIAGNOSIS — R06.02 SHORTNESS OF BREATH: ICD-10-CM

## 2024-10-28 DIAGNOSIS — Z98.51 TUBAL LIGATION STATUS: Chronic | ICD-10-CM

## 2024-10-28 DIAGNOSIS — Z98.49 CATARACT EXTRACTION STATUS, UNSPECIFIED EYE: Chronic | ICD-10-CM

## 2024-10-28 PROCEDURE — 94727 GAS DIL/WSHOT DETER LNG VOL: CPT

## 2024-10-28 PROCEDURE — 94727 GAS DIL/WSHOT DETER LNG VOL: CPT | Mod: 26

## 2024-10-28 PROCEDURE — 71046 X-RAY EXAM CHEST 2 VIEWS: CPT

## 2024-10-28 PROCEDURE — 94729 DIFFUSING CAPACITY: CPT

## 2024-10-28 PROCEDURE — 94060 EVALUATION OF WHEEZING: CPT | Mod: 26

## 2024-10-28 PROCEDURE — 94070 EVALUATION OF WHEEZING: CPT

## 2024-10-28 PROCEDURE — 94664 DEMO&/EVAL PT USE INHALER: CPT

## 2024-10-28 PROCEDURE — 94729 DIFFUSING CAPACITY: CPT | Mod: 26

## 2024-10-28 PROCEDURE — 71046 X-RAY EXAM CHEST 2 VIEWS: CPT | Mod: 26

## 2024-10-29 DIAGNOSIS — R06.02 SHORTNESS OF BREATH: ICD-10-CM

## 2024-11-18 ENCOUNTER — APPOINTMENT (OUTPATIENT)
Dept: CARDIOLOGY | Facility: CLINIC | Age: 89
End: 2024-11-18

## 2025-01-06 ENCOUNTER — APPOINTMENT (OUTPATIENT)
Dept: ORTHOPEDIC SURGERY | Facility: CLINIC | Age: 89
End: 2025-01-06

## 2025-01-13 ENCOUNTER — APPOINTMENT (OUTPATIENT)
Dept: ORTHOPEDIC SURGERY | Facility: CLINIC | Age: 89
End: 2025-01-13

## 2025-01-21 ENCOUNTER — APPOINTMENT (OUTPATIENT)
Dept: ORTHOPEDIC SURGERY | Facility: CLINIC | Age: 89
End: 2025-01-21

## 2025-02-14 NOTE — ED ADULT TRIAGE NOTE - PRO INTERPRETER NEED 2
PCP: Wolfgang Ulloa MD    Last appt: 11/15/2024    Future Appointments   Date Time Provider Department Center   2/17/2025  9:00 AM Wolfgang Ulloa MD [facility] DEP       Requested Prescriptions     Pending Prescriptions Disp Refills    levothyroxine (SYNTHROID) 88 MCG tablet [Pharmacy Med Name: LEVOTHYROXINE 88 MCG TABLET] 90 tablet 1     Sig: TAKE 1 TABLET BY MOUTH EVERY DAY       English

## 2025-02-24 ENCOUNTER — RX RENEWAL (OUTPATIENT)
Age: 89
End: 2025-02-24

## 2025-03-20 ENCOUNTER — APPOINTMENT (OUTPATIENT)
Dept: ORTHOPEDIC SURGERY | Facility: CLINIC | Age: 89
End: 2025-03-20

## 2025-03-27 ENCOUNTER — APPOINTMENT (OUTPATIENT)
Dept: ORTHOPEDIC SURGERY | Facility: CLINIC | Age: 89
End: 2025-03-27

## 2025-04-03 ENCOUNTER — APPOINTMENT (OUTPATIENT)
Dept: ORTHOPEDIC SURGERY | Facility: CLINIC | Age: 89
End: 2025-04-03

## 2025-04-06 NOTE — ED ADULT NURSE NOTE - NSFALLRSKASSESSTYPE_ED_ALL_ED
[Time Spent: ___ minutes] : I have spent [unfilled] minutes of time on the encounter which excludes teaching and separately reported services. Initial (On Arrival)

## 2025-05-06 NOTE — ED ADULT NURSE NOTE - CAS DISCH ACCOMP BY
Please call mom back; yes, fine to stop antibiotics and observe. Suggest some yogurt, probiotics to help with tummy issues and follow up if any concerns. Thanks.   Self

## 2025-05-08 ENCOUNTER — APPOINTMENT (OUTPATIENT)
Dept: PULMONOLOGY | Facility: CLINIC | Age: 89
End: 2025-05-08
Payer: MEDICARE

## 2025-05-08 VITALS
DIASTOLIC BLOOD PRESSURE: 60 MMHG | WEIGHT: 99 LBS | SYSTOLIC BLOOD PRESSURE: 118 MMHG | HEART RATE: 80 BPM | RESPIRATION RATE: 14 BRPM | BODY MASS INDEX: 16.99 KG/M2 | OXYGEN SATURATION: 96 %

## 2025-05-08 DIAGNOSIS — J44.9 CHRONIC OBSTRUCTIVE PULMONARY DISEASE, UNSPECIFIED: ICD-10-CM

## 2025-05-08 DIAGNOSIS — R91.1 SOLITARY PULMONARY NODULE: ICD-10-CM

## 2025-05-08 LAB
BASOPHILS # BLD AUTO: 0.11 K/UL
BASOPHILS NFR BLD AUTO: 1.1 %
EOSINOPHIL # BLD AUTO: 0.05 K/UL
EOSINOPHIL NFR BLD AUTO: 0.5 %
HCT VFR BLD CALC: 38.1 %
HGB BLD-MCNC: 12.9 G/DL
IMM GRANULOCYTES NFR BLD AUTO: 0.5 %
LYMPHOCYTES # BLD AUTO: 1.54 K/UL
LYMPHOCYTES NFR BLD AUTO: 15 %
MAN DIFF?: NORMAL
MCHC RBC-ENTMCNC: 32.3 PG
MCHC RBC-ENTMCNC: 33.9 G/DL
MCV RBC AUTO: 95.5 FL
MONOCYTES # BLD AUTO: 1.07 K/UL
MONOCYTES NFR BLD AUTO: 10.4 %
NEUTROPHILS # BLD AUTO: 7.42 K/UL
NEUTROPHILS NFR BLD AUTO: 72.5 %
PLATELET # BLD AUTO: 321 K/UL
PMV BLD AUTO: 0 /100 WBCS
PMV BLD: 10 FL
RBC # BLD: 3.99 M/UL
RBC # FLD: 13.7 %
WBC # FLD AUTO: 10.24 K/UL

## 2025-05-08 PROCEDURE — G2211 COMPLEX E/M VISIT ADD ON: CPT

## 2025-05-08 PROCEDURE — 99214 OFFICE O/P EST MOD 30 MIN: CPT

## 2025-06-05 NOTE — ED ADULT TRIAGE NOTE - HEIGHT IN FEET
5 Medical Necessity Information: It is in your best interest to select a reason for this procedure from the list below. All of these items fulfill various CMS LCD requirements except the new and changing color options. Medical Necessity Clause: This procedure was medically necessary because the lesion that was treated was: Lab: [id number] Lab Facility: [id number] Body Location Override (Optional - Billing Will Still Be Based On Selected Body Map Location If Applicable): left ventral forearm Detail Level: Detailed Was A Bandage Applied: Yes Size Of Lesion In Cm (Required): 0.4 X Size Of Lesion In Cm (Optional): 0.5 Depth Of Shave: dermis Biopsy Method: Dermablade Anesthesia Type: 1% lidocaine with epinephrine Hemostasis: Drysol Wound Care: Petrolatum Path Notes (To The Dermatopathologist): Size: 0.4x0.5 Render Path Notes In Note?: No Consent was obtained from the patient. The risks and benefits to therapy were discussed in detail. Specifically, the risks of infection, scarring, bleeding, prolonged wound healing, incomplete removal, allergy to anesthesia, nerve injury and recurrence were addressed. Prior to the procedure, the treatment site was clearly identified and confirmed by the patient. All components of Universal Protocol/PAUSE Rule completed. Post-Care Instructions: I reviewed with the patient in detail post-care instructions. Patient is to keep the biopsy site dry overnight, and then apply bacitracin twice daily until healed. Patient may apply hydrogen peroxide soaks to remove any crusting. Notification Instructions: Patient will be notified of pathology results. However, patient instructed to call the office if not contacted within 2 weeks. Billing Type: Third-Party Bill

## 2025-07-07 NOTE — ED PROVIDER NOTE - CADM POA PRESS ULCER
Spoke with Luarita. She is currently on 5mg. She will be going on vacation next Tuesday and does not want to run out. Please send to [id number] - [place], [place] - [address]   Advised to make follow up appointment, transferred to PSR to schedule   No

## 2025-07-24 ENCOUNTER — EMERGENCY (EMERGENCY)
Facility: HOSPITAL | Age: 89
LOS: 0 days | Discharge: HOME | End: 2025-07-24
Attending: STUDENT IN AN ORGANIZED HEALTH CARE EDUCATION/TRAINING PROGRAM
Payer: MEDICARE

## 2025-07-24 VITALS
SYSTOLIC BLOOD PRESSURE: 101 MMHG | OXYGEN SATURATION: 97 % | HEART RATE: 83 BPM | WEIGHT: 98.11 LBS | DIASTOLIC BLOOD PRESSURE: 64 MMHG | TEMPERATURE: 97 F | RESPIRATION RATE: 18 BRPM

## 2025-07-24 DIAGNOSIS — I10 ESSENTIAL (PRIMARY) HYPERTENSION: ICD-10-CM

## 2025-07-24 DIAGNOSIS — Z98.49 CATARACT EXTRACTION STATUS, UNSPECIFIED EYE: Chronic | ICD-10-CM

## 2025-07-24 DIAGNOSIS — Z98.51 TUBAL LIGATION STATUS: Chronic | ICD-10-CM

## 2025-07-24 DIAGNOSIS — J44.9 CHRONIC OBSTRUCTIVE PULMONARY DISEASE, UNSPECIFIED: ICD-10-CM

## 2025-07-24 DIAGNOSIS — S01.21XA LACERATION WITHOUT FOREIGN BODY OF NOSE, INITIAL ENCOUNTER: ICD-10-CM
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
## 2025-07-24 DIAGNOSIS — Z23 ENCOUNTER FOR IMMUNIZATION: ICD-10-CM

## 2025-07-24 DIAGNOSIS — S01.81XA LACERATION WITHOUT FOREIGN BODY OF OTHER PART OF HEAD, INITIAL ENCOUNTER: ICD-10-CM

## 2025-07-24 DIAGNOSIS — M43.22 FUSION OF SPINE, CERVICAL REGION: Chronic | ICD-10-CM
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
## 2025-07-24 DIAGNOSIS — S09.90XA UNSPECIFIED INJURY OF HEAD, INITIAL ENCOUNTER: ICD-10-CM

## 2025-07-24 DIAGNOSIS — S80.01XA CONTUSION OF RIGHT KNEE, INITIAL ENCOUNTER: ICD-10-CM

## 2025-07-24 DIAGNOSIS — E78.5 HYPERLIPIDEMIA, UNSPECIFIED: ICD-10-CM

## 2025-07-24 DIAGNOSIS — Z98.1 ARTHRODESIS STATUS: Chronic | ICD-10-CM

## 2025-07-24 DIAGNOSIS — M25.552 PAIN IN LEFT HIP: ICD-10-CM

## 2025-07-24 LAB
ALBUMIN SERPL ELPH-MCNC: 4.2 G/DL — SIGNIFICANT CHANGE UP (ref 3.5–5.2)
ALP SERPL-CCNC: 80 U/L — SIGNIFICANT CHANGE UP (ref 30–115)
ALT FLD-CCNC: 15 U/L — SIGNIFICANT CHANGE UP (ref 0–41)
ANION GAP SERPL CALC-SCNC: 19 MMOL/L — HIGH (ref 7–14)
APTT BLD: 27.4 SEC — SIGNIFICANT CHANGE UP (ref 27–39.2)
AST SERPL-CCNC: 25 U/L — SIGNIFICANT CHANGE UP (ref 0–41)
BASOPHILS # BLD AUTO: 0.1 K/UL — SIGNIFICANT CHANGE UP (ref 0–0.2)
BASOPHILS NFR BLD AUTO: 0.9 % — SIGNIFICANT CHANGE UP (ref 0–2)
BILIRUB SERPL-MCNC: 0.4 MG/DL — SIGNIFICANT CHANGE UP (ref 0.2–1.2)
BUN SERPL-MCNC: 16 MG/DL — SIGNIFICANT CHANGE UP (ref 10–20)
CALCIUM SERPL-MCNC: 9.3 MG/DL — SIGNIFICANT CHANGE UP (ref 8.4–10.5)
CHLORIDE SERPL-SCNC: 93 MMOL/L — LOW (ref 98–110)
CO2 SERPL-SCNC: 20 MMOL/L — SIGNIFICANT CHANGE UP (ref 17–32)
CREAT SERPL-MCNC: 1 MG/DL — SIGNIFICANT CHANGE UP (ref 0.7–1.5)
EGFR: 54 ML/MIN/1.73M2 — LOW
EGFR: 54 ML/MIN/1.73M2 — LOW
EOSINOPHIL # BLD AUTO: 0.03 K/UL — SIGNIFICANT CHANGE UP (ref 0–0.5)
EOSINOPHIL NFR BLD AUTO: 0.3 % — SIGNIFICANT CHANGE UP (ref 0–6)
GLUCOSE SERPL-MCNC: 79 MG/DL — SIGNIFICANT CHANGE UP (ref 70–99)
HCT VFR BLD CALC: 34.3 % — LOW (ref 34.5–45)
HGB BLD-MCNC: 11.7 G/DL — SIGNIFICANT CHANGE UP (ref 11.5–15.5)
IMM GRANULOCYTES # BLD AUTO: 0.06 K/UL — SIGNIFICANT CHANGE UP (ref 0–0.07)
IMM GRANULOCYTES NFR BLD AUTO: 0.5 % — SIGNIFICANT CHANGE UP (ref 0–0.9)
INR BLD: 0.89 RATIO — SIGNIFICANT CHANGE UP (ref 0.65–1.3)
LYMPHOCYTES # BLD AUTO: 1.28 K/UL — SIGNIFICANT CHANGE UP (ref 1–3.3)
LYMPHOCYTES NFR BLD AUTO: 11 % — LOW (ref 13–44)
MCHC RBC-ENTMCNC: 31.5 PG — SIGNIFICANT CHANGE UP (ref 27–34)
MCHC RBC-ENTMCNC: 34.1 G/DL — SIGNIFICANT CHANGE UP (ref 32–36)
MCV RBC AUTO: 92.5 FL — SIGNIFICANT CHANGE UP (ref 80–100)
MONOCYTES # BLD AUTO: 1.09 K/UL — HIGH (ref 0–0.9)
MONOCYTES NFR BLD AUTO: 9.4 % — SIGNIFICANT CHANGE UP (ref 2–14)
NEUTROPHILS # BLD AUTO: 9.04 K/UL — HIGH (ref 1.8–7.4)
NEUTROPHILS NFR BLD AUTO: 77.9 % — HIGH (ref 43–77)
NRBC # BLD AUTO: 0 K/UL — SIGNIFICANT CHANGE UP (ref 0–0)
NRBC # FLD: 0 K/UL — SIGNIFICANT CHANGE UP (ref 0–0)
NRBC BLD AUTO-RTO: 0 /100 WBCS — SIGNIFICANT CHANGE UP (ref 0–0)
PLATELET # BLD AUTO: 363 K/UL — SIGNIFICANT CHANGE UP (ref 150–400)
PMV BLD: 9.6 FL — SIGNIFICANT CHANGE UP (ref 7–13)
POTASSIUM SERPL-MCNC: 4.4 MMOL/L — SIGNIFICANT CHANGE UP (ref 3.5–5)
POTASSIUM SERPL-SCNC: 4.4 MMOL/L — SIGNIFICANT CHANGE UP (ref 3.5–5)
PROT SERPL-MCNC: 6.2 G/DL — SIGNIFICANT CHANGE UP (ref 6–8)
PROTHROM AB SERPL-ACNC: 10.5 SEC — SIGNIFICANT CHANGE UP (ref 9.95–12.87)
RBC # BLD: 3.71 M/UL — LOW (ref 3.8–5.2)
RBC # FLD: 12.7 % — SIGNIFICANT CHANGE UP (ref 10.3–14.5)
SODIUM SERPL-SCNC: 132 MMOL/L — LOW (ref 135–146)
WBC # BLD: 11.6 K/UL — HIGH (ref 3.8–10.5)
WBC # FLD AUTO: 11.6 K/UL — HIGH (ref 3.8–10.5)

## 2025-07-24 PROCEDURE — 12014 RPR F/E/E/N/L/M 5.1-7.5 CM: CPT

## 2025-07-24 PROCEDURE — 85730 THROMBOPLASTIN TIME PARTIAL: CPT

## 2025-07-24 PROCEDURE — 71045 X-RAY EXAM CHEST 1 VIEW: CPT

## 2025-07-24 PROCEDURE — 72125 CT NECK SPINE W/O DYE: CPT

## 2025-07-24 PROCEDURE — 90471 IMMUNIZATION ADMIN: CPT

## 2025-07-24 PROCEDURE — 90715 TDAP VACCINE 7 YRS/> IM: CPT

## 2025-07-24 PROCEDURE — 72170 X-RAY EXAM OF PELVIS: CPT

## 2025-07-24 PROCEDURE — 70450 CT HEAD/BRAIN W/O DYE: CPT

## 2025-07-24 PROCEDURE — 73562 X-RAY EXAM OF KNEE 3: CPT | Mod: RT

## 2025-07-24 PROCEDURE — 73552 X-RAY EXAM OF FEMUR 2/>: CPT | Mod: 26,LT

## 2025-07-24 PROCEDURE — 74177 CT ABD & PELVIS W/CONTRAST: CPT

## 2025-07-24 PROCEDURE — 71045 X-RAY EXAM CHEST 1 VIEW: CPT | Mod: 26

## 2025-07-24 PROCEDURE — 85610 PROTHROMBIN TIME: CPT

## 2025-07-24 PROCEDURE — 71260 CT THORAX DX C+: CPT | Mod: 26

## 2025-07-24 PROCEDURE — 99285 EMERGENCY DEPT VISIT HI MDM: CPT | Mod: FS,25

## 2025-07-24 PROCEDURE — 73552 X-RAY EXAM OF FEMUR 2/>: CPT | Mod: LT

## 2025-07-24 PROCEDURE — 36415 COLL VENOUS BLD VENIPUNCTURE: CPT

## 2025-07-24 PROCEDURE — 36000 PLACE NEEDLE IN VEIN: CPT | Mod: XU

## 2025-07-24 PROCEDURE — 99284 EMERGENCY DEPT VISIT MOD MDM: CPT | Mod: 25

## 2025-07-24 PROCEDURE — 73562 X-RAY EXAM OF KNEE 3: CPT | Mod: 26,RT

## 2025-07-24 PROCEDURE — 80053 COMPREHEN METABOLIC PANEL: CPT

## 2025-07-24 PROCEDURE — 70450 CT HEAD/BRAIN W/O DYE: CPT | Mod: 26

## 2025-07-24 PROCEDURE — 73502 X-RAY EXAM HIP UNI 2-3 VIEWS: CPT | Mod: LT

## 2025-07-24 PROCEDURE — 85025 COMPLETE CBC W/AUTO DIFF WBC: CPT

## 2025-07-24 PROCEDURE — 70486 CT MAXILLOFACIAL W/O DYE: CPT | Mod: 26

## 2025-07-24 PROCEDURE — 72125 CT NECK SPINE W/O DYE: CPT | Mod: 26

## 2025-07-24 PROCEDURE — 71260 CT THORAX DX C+: CPT

## 2025-07-24 PROCEDURE — 70486 CT MAXILLOFACIAL W/O DYE: CPT

## 2025-07-24 PROCEDURE — 74177 CT ABD & PELVIS W/CONTRAST: CPT | Mod: 26

## 2025-07-24 PROCEDURE — 73502 X-RAY EXAM HIP UNI 2-3 VIEWS: CPT | Mod: 26,LT

## 2025-07-24 PROCEDURE — 72170 X-RAY EXAM OF PELVIS: CPT | Mod: 26,XE

## 2025-07-24 RX ORDER — ACETAMINOPHEN 500 MG/5ML
975 LIQUID (ML) ORAL ONCE
Refills: 0 | Status: COMPLETED | OUTPATIENT
Start: 2025-07-24 | End: 2025-07-24

## 2025-07-24 RX ADMIN — Medication 975 MILLIGRAM(S): at 22:43

## 2025-07-24 NOTE — ED ADULT NURSE NOTE - NSICDXPASTMEDICALHX_GEN_ALL_CORE_FT
PAST MEDICAL HISTORY:  COPD, mild     H/O degenerative disc disease     H/O fracture of patella RIGHT- NO SURGERY    HTN (hypertension)     Hypercholesterolemia     Lung nodule NO SURGERY    Osteoarthritis     Right shoulder pain

## 2025-07-24 NOTE — ED PROVIDER NOTE - CARE PLAN
Principal Discharge DX:	Closed head injury  Secondary Diagnosis:	Knee pain, right  Secondary Diagnosis:	Laceration of face   1

## 2025-07-24 NOTE — ED PROVIDER NOTE - PATIENT PORTAL LINK FT
You can access the FollowMyHealth Patient Portal offered by [facility] by registering at the following website: [email or web address]. By joining [facility]’s FollowMyHealth portal, you will also be able to view your health information using other applications (apps) compatible with our system.

## 2025-07-24 NOTE — ED PROVIDER NOTE - CLINICAL SUMMARY MEDICAL DECISION MAKING FREE TEXT BOX
89 yo female, PMHx of HTN, HLD, COPD, presenting for evaluation after mechanical fall.  She states she lost her balance and fell onto her knees on the ground in the kitchen.  Endorses laceration to face, left hip pain, and right knee pain.  Has been ambulatory since.  Denies LOC, AC use, chest pain, shortness of breath, dizziness, nausea, vomiting, abdominal pain, weakness.  Labs were ordered and reviewed.  Imaging was ordered and reviewed by me.  Appropriate medications for patient's presenting complaints were ordered and effects were reassessed.  Patient's records (prior hospital, ED visit, and/or nursing home notes if available) were reviewed.  Escalation to admission/observation was considered.    Discussed all results, including incidental findings (pulmonary nodule, age indeterminate infarct).  Patient was found to have an incidental finding on their imaging in the emergency department today.  A copy of the result was given to the patient, and they were advised to follow up with her primary doctor.  They are aware that this may be very important and may require further work-up and repeat imaging.  All questions and concerns about this finding were addressed. 89 yo female, PMHx of HTN, HLD, COPD, presenting for evaluation after mechanical fall.  She states she lost her balance and fell onto her knees on the ground in the kitchen.  Endorses laceration to face, left hip pain, and right knee pain.  Has been ambulatory since.  Denies LOC, AC use, chest pain, shortness of breath, dizziness, nausea, vomiting, abdominal pain, weakness.  Labs were ordered and reviewed.  Imaging was ordered and reviewed by me.  Appropriate medications for patient's presenting complaints were ordered and effects were reassessed.  Patient's records (prior hospital, ED visit, and/or nursing home notes if available) were reviewed.  Escalation to admission/observation was considered.    Discussed all results, including incidental findings (pulmonary nodule, age indeterminate infarct).  Patient was found to have an incidental finding on their imaging in the emergency department today.  A copy of the result was given to the patient, and they were advised to follow up with her primary doctor.  They are aware that this may be very important and may require further work-up and repeat imaging.  All questions and concerns about this finding were addressed.  Admission offered but patient prefers discharge.  Provided walker.  Discussed return precautions and follow up outpatient.  Patient and family member at bedside comfortable with plan.

## 2025-07-24 NOTE — ED PROVIDER NOTE - PROGRESS NOTE DETAILS
ambulating with walker, offered admission pt declined, will be dc with grandson. fs: assigned in error

## 2025-07-24 NOTE — ED PROVIDER NOTE - ATTENDING APP SHARED VISIT CONTRIBUTION OF CARE
89 yo female, PMHx of HTN, HLD, COPD, presenting for evaluation after mechanical fall.  She states she lost her balance and fell onto her knees on the ground in the kitchen.  Endorses laceration to face, left hip pain, and right knee pain.  Has been ambulatory since.  Denies LOC, AC use, chest pain, shortness of breath, dizziness, nausea, vomiting, abdominal pain, weakness.    CONSTITUTIONAL: Well-developed; well-nourished; in no acute distress.   SKIN: warm, dry  HEAD: Normocephalic; +right cheek laceration 5 cm and nasal bridge laceration 1 cm with oozing, no facial crepitus  EYES: PERRL, EOMI, no conjunctival erythema  ENT: No nasal discharge; airway clear.  NECK: Supple; non tender.  BACK: no midline or paraspinal tenderness  ABD: soft ntnd  EXT: Able to flex actively at bilateral hips.  Limited ROM of right knee due to pain with swelling and ecchymoses of anterior aspect.    NEURO: Alert, oriented, grossly unremarkable  PSYCH: Cooperative, appropriate.

## 2025-07-24 NOTE — ED ADULT NURSE NOTE - NSFALLHARMRISKINTERV_ED_ALL_ED
Assistance OOB with selected safe patient handling equipment if applicable/Assistance with ambulation/Communicate risk of Fall with Harm to all staff, patient, and family/Monitor gait and stability/Provide visual cue: red socks, yellow wristband, yellow gown, etc/Reinforce activity limits and safety measures with patient and family/Toileting schedule using arm’s reach rule for commode and bathroom/Bed in lowest position, wheels locked, appropriate side rails in place/Call bell, personal items and telephone in reach/Instruct patient to call for assistance before getting out of bed/chair/stretcher/Non-slip footwear applied when patient is off stretcher/[place] to call system/Physically safe environment - no spills, clutter or unnecessary equipment/Purposeful Proactive Rounding/Room/bathroom lighting operational, light cord in reach Assistance OOB with selected safe patient handling equipment if applicable/Assistance with ambulation/Communicate risk of Fall with Harm to all staff, patient, and family/Monitor gait and stability/Provide patient with walking aids/Provide visual cue: red socks, yellow wristband, yellow gown, etc/Reinforce activity limits and safety measures with patient and family/Toileting schedule using arm’s reach rule for commode and bathroom/Bed in lowest position, wheels locked, appropriate side rails in place/Call bell, personal items and telephone in reach/Instruct patient to call for assistance before getting out of bed/chair/stretcher/Non-slip footwear applied when patient is off stretcher/[place] to call system/Physically safe environment - no spills, clutter or unnecessary equipment/Purposeful Proactive Rounding/Room/bathroom lighting operational, light cord in reach

## 2025-07-24 NOTE — ED PROVIDER NOTE - NSFOLLOWUPINSTRUCTIONS_ED_ALL_ED_FT
Return in 7 days for suture removal.       Closed Head Injury    A closed head injury is an injury to your head that may or may not involve a traumatic brain injury (TBI). Symptoms of TBI can be short or long lasting and include headache, dizziness, interference with memory or speech, fatigue, confusion, changes in sleep, mood changes, nausea, depression/anxiety, and dulling of senses. Make sure to obtain proper rest which includes getting plenty of sleep, avoiding excessive visual stimulation, and avoiding activities that may cause physical or mental stress. Avoid any situation where there is potential for another head injury, including sports.    SEEK IMMEDIATE MEDICAL CARE IF YOU HAVE ANY OF THE FOLLOWING SYMPTOMS: unusual drowsiness, vomiting, severe dizziness, seizures, lightheadedness, muscular weakness, different pupil sizes, visual changes, or clear or bloody discharge from your ears or nose.       Laceration    A laceration is a cut that goes through all of the layers of the skin and into the tissue that is right under the skin. Some lacerations heal on their own. Others need to be closed with skin adhesive strips, skin glue, stitches (sutures), or staples. Proper laceration care minimizes the risk of infection and helps the laceration to heal better.  If non-absorbable stitches or staples have been placed, they must be taken out within the time frame instructed by your healthcare provider.    SEEK IMMEDIATE MEDICAL CARE IF YOU HAVE ANY OF THE FOLLOWING SYMPTOMS: swelling around the wound, worsening pain, drainage from the wound, red streaking going away from your wound, inability to move finger or toe near the laceration, or discoloration of skin near the laceration.

## 2025-07-24 NOTE — ED PROVIDER NOTE - OBJECTIVE STATEMENT
89 yo female, pmh of htn, hld, copd, s/p fal, c/o head injury, lac to right side of face, right knee pain, left hip pain, was able to ambulate afterwards. Denies fever, chills, cp, sob, neck pain, visual changes, nvd, dizziness, numbness, tingling.

## 2025-07-31 ENCOUNTER — EMERGENCY (EMERGENCY)
Facility: HOSPITAL | Age: 89
LOS: 0 days | Discharge: HOME | End: 2025-07-31
Attending: STUDENT IN AN ORGANIZED HEALTH CARE EDUCATION/TRAINING PROGRAM
Payer: MEDICARE

## 2025-07-31 VITALS
SYSTOLIC BLOOD PRESSURE: 112 MMHG | OXYGEN SATURATION: 99 % | HEART RATE: 72 BPM | RESPIRATION RATE: 18 BRPM | DIASTOLIC BLOOD PRESSURE: 65 MMHG | TEMPERATURE: 97 F

## 2025-07-31 DIAGNOSIS — Z98.49 CATARACT EXTRACTION STATUS, UNSPECIFIED EYE: Chronic | ICD-10-CM

## 2025-07-31 DIAGNOSIS — M43.22 FUSION OF SPINE, CERVICAL REGION: Chronic | ICD-10-CM

## 2025-07-31 DIAGNOSIS — S01.411D LACERATION WITHOUT FOREIGN BODY OF RIGHT CHEEK AND TEMPOROMANDIBULAR AREA, SUBSEQUENT ENCOUNTER: ICD-10-CM

## 2025-07-31 DIAGNOSIS — Z98.51 TUBAL LIGATION STATUS: Chronic | ICD-10-CM

## 2025-07-31 DIAGNOSIS — Z98.1 ARTHRODESIS STATUS: Chronic | ICD-10-CM

## 2025-07-31 DIAGNOSIS — S01.21XD LACERATION WITHOUT FOREIGN BODY OF NOSE, SUBSEQUENT ENCOUNTER: ICD-10-CM

## 2025-07-31 PROCEDURE — 99212 OFFICE O/P EST SF 10 MIN: CPT

## 2025-07-31 PROCEDURE — L9995: CPT

## 2025-07-31 NOTE — ED PROVIDER NOTE - PHYSICAL EXAMINATION
vital signs: I have reviewed the initial vital signs  constitutional: no acute distress, normocephalic  msk: neck supple, gait steady  eyes: perrla, eomi  skin: +laceration healed to nasal bridge and right cheek with sutures in place no bleeding from wound, no swelling or bruising  neuro: no sensory or motor deficits of extremity. no focal deficits, gait steady, AOx3

## 2025-07-31 NOTE — ED PROVIDER NOTE - PATIENT PORTAL LINK FT
You can access the FollowMyHealth Patient Portal offered by [facility] by registering at the following website: [email or web address]. By joining [facility]’s FollowMyHealth portal, you will also be able to view your health information using other applications (apps) compatible with our system.

## 2025-07-31 NOTE — ED PROVIDER NOTE - CLINICAL SUMMARY MEDICAL DECISION MAKING FREE TEXT BOX
89 yo F presents to ED for suture removal to face and nose, had sutures placed here on 7/24, denies any fevers or other complaints.    CONSTITUTIONAL: NAD.   SKIN: warm, dry. well healing laceration to nasal bridge and R cheek sutures in place   HEAD: Normocephalic; atraumatic.  EYES: no conjunctival injection  EOMI.   ENT: MMM.   NECK: Supple.  CARD: RRR.      assessment and plan- sutures removed     Patient's records (prior hospital, ED visit, and/or nursing home notes if available) were reviewed.  Additional history was obtained from EMS, family, and/or PCP (where available).  Escalation to admission/observation was considered.  However patient feels much better and is comfortable with discharge.  Appropriate follow-up was arranged. Return precautions discussed in detail.

## 2025-07-31 NOTE — ED PROVIDER NOTE - OBJECTIVE STATEMENT
89 y/o female presents to the Ed for suture removal to right face and nose . sutures placed 7 days ago. no swelling, redness or drainage. no increased pain to area.

## (undated) DEVICE — ELCTR BOVIE TIP BLADE MEGADYNE E-Z CLEAN 4" EXTENDED

## (undated) DEVICE — GLV 7 PROTEXIS ORTHO (CREAM)

## (undated) DEVICE — SUT PDS II PLUS 1 27" CT-1

## (undated) DEVICE — HOOD FLYTE STRYKER HELMET SHIELD

## (undated) DEVICE — DRSG DERMABOND 0.7ML

## (undated) DEVICE — DRSG STOCKINETTE IMPERVIOUS XL

## (undated) DEVICE — DRAPE U POLY BLUE 60X72"

## (undated) DEVICE — WOUND IRR SURGIPHOR

## (undated) DEVICE — SUT PDS II PLUS 1 27" CP-1

## (undated) DEVICE — DRSG COBAN 6"

## (undated) DEVICE — SUT ETHIBOND 5 4-30" V-37

## (undated) DEVICE — NDL HYPO SAFE 20G X 1.5" (YELLOW)

## (undated) DEVICE — SUT STRATAFIX SYMMETRIC PDS PLUS 1 18" CT

## (undated) DEVICE — ELCTR BOVIE TIP CLEANER SCRATCH PAD 1 7/8 X 1 7/8"

## (undated) DEVICE — DRAPE LIGHT HANDLE COVER (BLUE)

## (undated) DEVICE — SUT STRATAFIX SYMMETRIC PDS 1 45CM OS-6

## (undated) DEVICE — SUT MONOCRYL 3-0 18" PS-1

## (undated) DEVICE — DRAPE LIGHT HANDLE COVER (GREEN)

## (undated) DEVICE — GLV 7 PROTEXIS (WHITE)

## (undated) DEVICE — SUT RETRIEVER LASSO HOOK

## (undated) DEVICE — SYR LUER LOK 50CC

## (undated) DEVICE — SUCTION YANKAUER OPEN TIP NO VENT CURVE

## (undated) DEVICE — DRAPE HIP W POUCHES 87X115X134"

## (undated) DEVICE — PACK TOTAL HIP

## (undated) DEVICE — GLV 7.5 PROTEXIS (WHITE)

## (undated) DEVICE — DRILL BIT STRYKER ORTHO LOKG 4.2X360MM

## (undated) DEVICE — PREP CHLORAPREP HI-LITE ORANGE 26ML

## (undated) DEVICE — Device

## (undated) DEVICE — DRAPE IOBAN 33" X 23"

## (undated) DEVICE — DRSG DERMABOND PRINEO 22CM

## (undated) DEVICE — DRILL BIT S&N ACETABULAR 25MM

## (undated) DEVICE — NDL HYPO SAFE 22G X 1.5" (BLACK)

## (undated) DEVICE — SUT MONOCRYL 3-0 27" PS-1 UNDYED

## (undated) DEVICE — SUT STRATAFIX SYMMETRIC PDS PLUS 1 60CM CTX VIOLET

## (undated) DEVICE — SUT VICRYL PLUS 2-0 27" SH UNDYED

## (undated) DEVICE — DRAPE TOWEL BLUE 17" X 24"

## (undated) DEVICE — BLADE SURGICAL #15 CARBON

## (undated) DEVICE — HOOD T5 PEELAWAY

## (undated) DEVICE — SYR CATH TIP 2 OZ

## (undated) DEVICE — GLV 6.5 PROTEXIS (WHITE)

## (undated) DEVICE — SUT VICRYL 2-0 27" CT-1 UNDYED

## (undated) DEVICE — DRAPE ISOLATION W INCISE FILM & POUCH

## (undated) DEVICE — SAW BLADE STRYKER SAGITTAL DUAL CUT 75X18X1.27MM